# Patient Record
Sex: FEMALE | Race: WHITE | Employment: FULL TIME | URBAN - METROPOLITAN AREA
[De-identification: names, ages, dates, MRNs, and addresses within clinical notes are randomized per-mention and may not be internally consistent; named-entity substitution may affect disease eponyms.]

---

## 2018-08-28 ENCOUNTER — HOSPITAL ENCOUNTER (OUTPATIENT)
Dept: CT IMAGING | Age: 53
Discharge: HOME | End: 2018-08-28
Attending: COLON & RECTAL SURGERY
Payer: COMMERCIAL

## 2018-08-28 DIAGNOSIS — R10.32 LLQ PAIN: ICD-10-CM

## 2018-08-28 PROCEDURE — 74177 CT ABD & PELVIS W/CONTRAST: CPT

## 2018-08-28 PROCEDURE — 74011000258 HC RX REV CODE- 258: Performed by: COLON & RECTAL SURGERY

## 2018-08-28 PROCEDURE — 74011636320 HC RX REV CODE- 636/320: Performed by: COLON & RECTAL SURGERY

## 2018-08-28 RX ORDER — SODIUM CHLORIDE 0.9 % (FLUSH) 0.9 %
10 SYRINGE (ML) INJECTION
Status: COMPLETED | OUTPATIENT
Start: 2018-08-28 | End: 2018-08-28

## 2018-08-28 RX ADMIN — SODIUM CHLORIDE 100 ML: 900 INJECTION, SOLUTION INTRAVENOUS at 16:47

## 2018-08-28 RX ADMIN — Medication 10 ML: at 16:47

## 2018-08-28 RX ADMIN — IOPAMIDOL 100 ML: 755 INJECTION, SOLUTION INTRAVENOUS at 16:47

## 2021-11-29 ENCOUNTER — OFFICE VISIT (OUTPATIENT)
Dept: PRIMARY CARE CLINIC | Age: 56
End: 2021-11-29

## 2021-11-29 VITALS
BODY MASS INDEX: 24.24 KG/M2 | SYSTOLIC BLOOD PRESSURE: 123 MMHG | OXYGEN SATURATION: 98 % | HEART RATE: 90 BPM | DIASTOLIC BLOOD PRESSURE: 83 MMHG | RESPIRATION RATE: 12 BRPM | TEMPERATURE: 98 F | HEIGHT: 64 IN | WEIGHT: 142 LBS

## 2021-11-29 DIAGNOSIS — M79.10 MYALGIA: ICD-10-CM

## 2021-11-29 DIAGNOSIS — J06.9 VIRAL URI: Primary | ICD-10-CM

## 2021-11-29 LAB
QUICKVUE INFLUENZA TEST: NEGATIVE
SARS-COV-2 POC: NEGATIVE
VALID INTERNAL CONTROL?: YES

## 2021-11-29 PROCEDURE — 87804 INFLUENZA ASSAY W/OPTIC: CPT | Performed by: NURSE PRACTITIONER

## 2021-11-29 PROCEDURE — 99203 OFFICE O/P NEW LOW 30 MIN: CPT | Performed by: NURSE PRACTITIONER

## 2021-11-29 PROCEDURE — 87426 SARSCOV CORONAVIRUS AG IA: CPT | Performed by: NURSE PRACTITIONER

## 2021-11-29 RX ORDER — THERA TABS 400 MCG
1 TAB ORAL DAILY
COMMUNITY

## 2021-11-29 NOTE — PROGRESS NOTES
Subjective:   Blease Oppenheim presents for evaluation of Cold Symptoms (Nov 27,2021)     This started 1 day ago, and is stable since that time. She also reports fever, fatigue, myalgias, headache and sore throat. Throat is \"scratchy\". Left ear feels a little clogged. She denies a history of: bilateral ear pain, dry cough, productive cough, SOB and PHILIPPE. Treatments have included: OTC products, Nyquil last night. Relevant PMH: No pertinent additional PMH. Patient reports sick contacts: no   She has not had flu or Covid vaccine. /83 (BP 1 Location: Left upper arm, BP Patient Position: Sitting, BP Cuff Size: Adult)   Pulse 90   Temp 98 °F (36.7 °C) (Skin)   Resp 12   Ht 5' 4\" (1.626 m)   Wt 142 lb (64.4 kg)   SpO2 98%   BMI 24.37 kg/m²     Physical Exam  General: alert, cooperative, no distress  Eye exam: negative  Ear exam: normal TM's and external ear canals AU  Sinus exam: Normal paranasal sinuses without tenderness  Oropharynx exam: Lips, mucosa, and tongue normal. Teeth and gums normal and normal findings: oropharynx pink & moist without lesions or evidence of thrush  Neck exam: supple, symmetrical, trachea midline and no adenopathy  Heart exam: normal rate, regular rhythm, normal S1, S2, no murmurs, rubs, clicks or gallops  Lung exam: clear to auscultation bilaterally      Results for orders placed or performed in visit on 11/29/21   AMB POC SARS-COV-2   Result Value Ref Range    SARS-COV-2 POC Negative Negative   AMB POC RAPID INFLUENZA TEST   Result Value Ref Range    VALID INTERNAL CONTROL POC Yes     QuickVue Influenza test Negative Negative   -[facility]    Assessment/Plan:   1. Viral URI  -     AMB POC SARS-COV-2  -     AMB POC RAPID INFLUENZA TEST  2. Myalgia  -     AMB POC RAPID INFLUENZA TEST    The above diagnosis is a new problem. We discussed expected course, resolution, and complications of diagnosis in detail.    Recommend home quarantine and consider repeat Covid testing in 24-48 hours if symptoms persist or worsen. No follow-ups on file. An electronic signature was used to authenticate this note.   -- Michael Diane, NP

## 2021-11-29 NOTE — PATIENT INSTRUCTIONS
Viral Respiratory Infection: Care Instructions  Your Care Instructions     Viruses are very small organisms. They grow in number after they enter your body. There are many types that cause different illnesses, such as colds and the mumps. The symptoms of a viral respiratory infection often start quickly. They include a fever, sore throat, and runny nose. You may also just not feel well. Or you may not want to eat much. Most viral respiratory infections are not serious. They usually get better with time and self-care. Antibiotics are not used to treat a viral infection. That's because antibiotics will not help cure a viral illness. In some cases, antiviral medicine can help your body fight a serious viral infection. Follow-up care is a key part of your treatment and safety. Be sure to make and go to all appointments, and call your doctor if you are having problems. It's also a good idea to know your test results and keep a list of the medicines you take. How can you care for yourself at home? · Rest as much as possible until you feel better. · Be safe with medicines. Take your medicine exactly as prescribed. Call your doctor if you think you are having a problem with your medicine. You will get more details on the specific medicine your doctor prescribes. · Take an over-the-counter pain medicine, such as acetaminophen (Tylenol), ibuprofen (Advil, Motrin), or naproxen (Aleve), as needed for pain and fever. Read and follow all instructions on the label. Do not give aspirin to anyone younger than 20. It has been linked to Reye syndrome, a serious illness. · Drink plenty of fluids. Hot fluids, such as tea or soup, may help relieve congestion in your nose and throat. If you have kidney, heart, or liver disease and have to limit fluids, talk with your doctor before you increase the amount of fluids you drink. · Try to clear mucus from your lungs by breathing deeply and coughing.   · Gargle with warm salt water once an hour. This can help reduce swelling and throat pain. Use 1 teaspoon of salt mixed in 1 cup of warm water. · Do not smoke or allow others to smoke around you. If you need help quitting, talk to your doctor about stop-smoking programs and medicines. These can increase your chances of quitting for good. To avoid spreading the virus  · Cough or sneeze into a tissue. Then throw the tissue away. · If you don't have a tissue, use your hand to cover your cough or sneeze. Then clean your hand. You can also cough into your sleeve. · Wash your hands often. Use soap and warm water. Wash for 15 to 20 seconds each time. · If you don't have soap and water near you, you can clean your hands with alcohol wipes or gel. When should you call for help? Call your doctor now or seek immediate medical care if:    · You have a new or higher fever.     · Your fever lasts more than 48 hours.     · You have trouble breathing.     · You have a fever with a stiff neck or a severe headache.     · You are sensitive to light.     · You feel very sleepy or confused. Watch closely for changes in your health, and be sure to contact your doctor if:    · You do not get better as expected. Where can you learn more? Go to http://www.gray.com/  Enter Q795 in the search box to learn more about \"Viral Respiratory Infection: Care Instructions. \"  Current as of: July 6, 2021               Content Version: 13.0  © 6431-5101 [employer]. Care instructions adapted under license by [employer] (which disclaims liability or warranty for this information). If you have questions about a medical condition or this instruction, always ask your healthcare professional. [place] [address] any warranty or liability for your use of this information.

## 2022-01-03 ENCOUNTER — OFFICE VISIT (OUTPATIENT)
Dept: PRIMARY CARE CLINIC | Age: 57
End: 2022-01-03

## 2022-01-03 DIAGNOSIS — U07.1 COVID-19: Primary | ICD-10-CM

## 2022-01-03 LAB — SARS-COV-2 POC: POSITIVE

## 2022-01-03 PROCEDURE — 87426 SARSCOV CORONAVIRUS AG IA: CPT | Performed by: NURSE PRACTITIONER

## 2022-01-03 PROCEDURE — 99441 PR PHYS/QHP TELEPHONE EVALUATION 5-10 MIN: CPT | Performed by: NURSE PRACTITIONER

## 2022-01-03 NOTE — PROGRESS NOTES
Sybil Brasher is a 61 y.o. female, evaluated via audio-only technology on 1/3/2022 for Concern For COVID-19 (Coronavirus)    Presents to [facility]. C/o fever, chills, cough, fatigue, myalgias, headache, congestion, runny nose, and nausea for 2 days. No known sick contacts. Has not had covid vaccine. Results for orders placed or performed in visit on 01/03/22   AMB POC SARS-COV-2   Result Value Ref Range    SARS-COV-2 POC Positive (A) Negative         Assessment & Plan:   Diagnoses and all orders for this visit:    1. COVID-19  -     AMB POC SARS-COV-2      The complexity of medical decision making for this visit is low         12  Subjective:       Prior to Admission medications    Medication Sig Start Date End Date Taking? Authorizing Provider   therapeutic multivitamin [facility]) tablet Take 1 Tablet by mouth daily. Provider, Historical   oxycodone-acetaminophen (PERCOCET) 5-325 mg per tablet Take 1 tablet by mouth every four (4) hours as needed. Patient not taking: Reported on 11/29/2021 10/31/14   Yadi Morales MD   estrogens, conjugated,-methylTESTOSTERone (ESTRATEST HS) 0.625-1.25 mg per tablet Take 1 tablet by mouth nightly. Patient not taking: Reported on 11/29/2021    Provider, Historical     No Known Allergies  Past Medical History:   Diagnosis Date    Cancer ([address]     skin    Nausea & vomiting     Unspecified adverse effect of anesthesia     hard to wake up     Past Surgical History:   Procedure Laterality Date    HX GI      COLONOSCOPY    HX GYN      ABALATION AND HYSTERECTOMY    HX PREMALIG/BENIGN SKIN LESION EXCISION      aggressive squamous cell x 2 with surgeries    HX TUBAL LIGATION      [place] BREAST SURGERY PROCEDURE UNLISTED      CYSTS L BREAST        ROS    No flowsheet data found.     Sybil Brasher, who was evaluated through a patient-initiated, synchronous (real-time) audio only encounter, and/or her healthcare decision maker, is aware that it is a billable service, with coverage as determined by her insurance carrier. She provided verbal consent to proceed: Yes. She has not had a related appointment within my department in the past 7 days or scheduled within the next 24 hours. The patient was called for notification of a POSITIVE test result for COVID-19. The following information was given to the patient:     The COVID-19 test result was positive   Mild and stable symptoms are managed at home     Treatment of coronavirus does not require an antibiotic   Remain isolated for 10 days since symptoms first appeared AND at least 3 days have passed after recovery    o Recovery is defined as resolution of fever without the use of fever-reducing medications with progressive improvement or resolution of other symptoms     Wash hands often with soap and water for at least 20 seconds or alternatively use hand  with at least 60% alcohol content   Cover coughs and sneezes   Wear a mask when around others if possible   Clean all high-touch surfaces every day, such as doorknobs and cellphones   Continually monitor symptoms. Contact your medical provider if symptoms are worsening, such as difficulty breathing   For more information visit the CDC website: DotProtection.gl       On this date 01/03/2022 I have spent 5 minutes reviewing previous notes, test results and face to face (virtual) with the patient discussing the diagnosis and importance of compliance with the treatment plan as well as documenting on the day of the visit.     Fredy Garcia NP

## 2023-05-18 RX ORDER — ESTERIFIED ESTROGEN AND METHYLTESTOSTERONE .625; 1.25 MG/1; MG/1
1 TABLET ORAL NIGHTLY
COMMUNITY

## 2023-05-18 RX ORDER — OXYCODONE HYDROCHLORIDE AND ACETAMINOPHEN 5; 325 MG/1; MG/1
1 TABLET ORAL EVERY 4 HOURS PRN
COMMUNITY
Start: 2014-10-31

## 2023-08-03 ENCOUNTER — OFFICE VISIT (OUTPATIENT)
Age: 58
End: 2023-08-03

## 2023-08-03 VITALS
SYSTOLIC BLOOD PRESSURE: 92 MMHG | BODY MASS INDEX: 25.4 KG/M2 | RESPIRATION RATE: 18 BRPM | TEMPERATURE: 97 F | OXYGEN SATURATION: 96 % | HEART RATE: 88 BPM | WEIGHT: 148 LBS | DIASTOLIC BLOOD PRESSURE: 63 MMHG

## 2023-08-03 DIAGNOSIS — R50.9 FEVER, UNSPECIFIED FEVER CAUSE: ICD-10-CM

## 2023-08-03 DIAGNOSIS — U07.1 COVID-19: Primary | ICD-10-CM

## 2023-08-03 DIAGNOSIS — D84.9 IMMUNOSUPPRESSED STATUS (HCC): ICD-10-CM

## 2023-08-03 PROBLEM — C77.8 SECONDARY AND MALIGNANT NEOPLASM OF LYMPH NODES OF MULTIPLE SITES (HCC): Status: ACTIVE | Noted: 2023-03-01

## 2023-08-03 PROBLEM — C44.42 SQUAMOUS CELL CARCINOMA OF NECK: Status: ACTIVE | Noted: 2023-02-22

## 2023-08-03 LAB
Lab: ABNORMAL
QC PASS/FAIL: ABNORMAL
SARS-COV-2, POC: DETECTED

## 2023-08-03 RX ORDER — LORATADINE 10 MG/1
10 TABLET ORAL DAILY
COMMUNITY

## 2023-08-03 RX ORDER — ACETAMINOPHEN 500 MG
TABLET ORAL PRN
COMMUNITY

## 2023-08-03 RX ORDER — CALCIUM CARB/VITAMIN D3/VIT K1 650MG-12.5
2 TABLET,CHEWABLE ORAL DAILY
COMMUNITY
End: 2023-08-03

## 2023-08-03 RX ORDER — DIPHENHYDRAMINE HYDROCHLORIDE 25 MG/1
10 TABLET ORAL DAILY
COMMUNITY

## 2023-08-03 NOTE — PROGRESS NOTES
Lucia Larson is a 60 y.o. female who presents for sinus congestion and low grade fever since yesterday. She states she was at the hair salon on Tuesday and noticed strong scents of chemicals there, woke up yesterday with a sinus HA and states temp has been . Clear nasal drainage. No sore throat or coughing or ear pain or other symptoms. She is on immunotherapy infusions q6wk for SCC onc is at [facility], last infusion was 4 wks ago. She has tried nyquil last night. Denies sick contacts. She is not vaccinated against covid. Past Medical History:   Diagnosis Date    Cancer ([address])     skin    Nausea & vomiting     Unspecified adverse effect of anesthesia     hard to wake up     Past Surgical History:   Procedure Laterality Date    BREAST SURGERY      CYSTS L BREAST     GI      COLONOSCOPY    GYN      ABALATION AND HYSTERECTOMY    PRE-MALIGNANT / BENIGN SKIN LESION EXCISION      aggressive squamous cell x 2 with surgeries    TUBAL LIGATION           Meds:   Current Outpatient Medications on File Prior to Visit   Medication Sig Dispense Refill    acetaminophen (TYLENOL) 500 MG tablet Take by mouth as needed      Multiple Vitamin (THERAPEUTIC MULTIVITAMIN PO) Take 1 tablet by mouth daily      Biotin 5 MG CAPS Take 10 mg by mouth daily      loratadine (CLARITIN) 10 MG tablet Take 1 tablet by mouth daily       No current facility-administered medications on file prior to visit.        Allergies:   No Known Allergies    Smoker:  Social History     Tobacco Use   Smoking Status Former    Packs/day: 0.25    Types: Cigarettes    Quit date: 1982    Years since quittin.6    Passive exposure: Never   Smokeless Tobacco Never       ETOH:   Social History     Substance and Sexual Activity   Alcohol Use Never       FH:   Family History   Problem Relation Age of Onset    Other Father         AUTO ACCIDENT    Hypertension Mother     Hypertension Father        ROS:  Per HPI    Physical Exam:  BP 92/63 (Site: Left Upper Arm,

## 2024-03-25 ENCOUNTER — HOSPITAL ENCOUNTER (OUTPATIENT)
Facility: HOSPITAL | Age: 59
Setting detail: RECURRING SERIES
Discharge: HOME | End: 2024-03-28
Payer: COMMERCIAL

## 2024-03-25 VITALS — WEIGHT: 159 LBS | BODY MASS INDEX: 27.14 KG/M2 | HEIGHT: 64 IN

## 2024-03-25 PROCEDURE — 97535 SELF CARE MNGMENT TRAINING: CPT

## 2024-03-25 PROCEDURE — 97162 PT EVAL MOD COMPLEX 30 MIN: CPT

## 2024-03-25 PROCEDURE — 97140 MANUAL THERAPY 1/> REGIONS: CPT

## 2024-03-25 PROCEDURE — 97110 THERAPEUTIC EXERCISES: CPT

## 2024-03-25 NOTE — THERAPY EVALUATION
Statement of Medical Necessity  Page 1 of 2      Kristin Ron 1965 Today's Date: 3/25/2024 HOWARD: [employer]   Payor: AETNA / Plan: AETNA / Product Type: *No Product type* /  ME: TBD  Refills: 2                   Diagnosis  []   I97.2 Post-Mastectomy Lymphedema []   I87.2 Venous Insufficiency   [x]   I89.0 Lymphedema, other secondary  []   I83.019 Venous Stasis Ulcer LE, Right   []   I89.9 Unspecified Lymphatic Disorder []   I83.029 Venous Stasis Ulcer LE, Left   []   R60.9 Swelling not relieved by elevation []   Q82.0 Hereditary/ Congenital Lymphedema   []   C50.211 Malignant neoplasm of breast, Right []   G89.3  Cancer associated pain   []   C50.212 Malignant neoplasm of breast, Left []   L03.115 LE Cellulitis, Right   []    []   L03.116 LE Cellulitis, Left                                   Kristin Ron    1965  Page 2 of 2    Physician Order for DME for Diagnosis of head and neck lymphedema as Listed on Statement of Medical Necessity, Page 1         Recommended Product:  Units  Head and neck lymphedema Rt Lt Units Lower Extremity Rt Lt    Circ-Aid, Ready Wrap, Sigvaris Arm    Inelastic binders (Circ-Aid, Farrow)  []Foot   []Below Knee   []Knee   []Thigh      Circ-Aid Ready Wrap, Sigvaris hand    Chevy Onesimo, night use []Full Leg  []Lower Leg      Tribute Arm, night use    Tribute, night use  []Full Leg  []Lower Leg      Chevy Onesimo Arm, night use    Heri Sleeve Leg/ Foot, night use      Gradiant Compression Sleeves  []Custom [] RM Arm:  []CCL1 []CCL2 []CCL3  []Custom [] RM:  []Glove  []Gauntlet:                         []CCL1 []CCL2 []CCL3      Gradient Compression Stockings   []Custom  []RM Lower Extremity:   []CCL1       []CCL2         []CCL3   []Knee       []Thigh        []Waist Length      Heri sleeve arm w/ hand, night use    Tribute Wrap, night use     2 Day compression for head and neck gilmar x x       1  Foam based night head and neck compression x x       The above patient was referred for treatment of 
with bilateral axillary nodes, anterior and posterior axillo-axillary anastamoses from midline to corresponding lymph node group,  supraclavicular techniques, cervical techniques, shoulder techniques from neck to acromion.  Worked anterior and lateral neck toward corresponding side of axillo axillary anastamosis on anterior upper quadrant.     Initiated patient and family education in manual lymph drainage techniques to trunk and neck today with therapist demonstration followed by patient redemonstration and handout provided.  They also recorded techniques.  Further review is needed.   100 72    Total Total       Patient Education: [x] Review HEP  -handout provided for cervical and shoulder range of motion  [x]  Patient Education billed concurrently with other procedures   [x] MLD Patient Education -initiated and handout provided  [] Progressed/Changed HEP based on:   [] positioning   [] Kinesiotape   [x] Skin care   [] wound care   [x] other: treatment components and goals, positioning,   [x]   vasopneumatic device availability  Patient / caregiver re-demonstrated bandaging. 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 Patient has received immunotherapy treatments but no radiation.  Discomfort is present in the region along with decreased cervical and left shoulder range of motion.  Tissue texture changes  are present in the affected region.  Patient

## 2024-04-01 ENCOUNTER — HOSPITAL ENCOUNTER (OUTPATIENT)
Facility: HOSPITAL | Age: 59
Setting detail: RECURRING SERIES
Discharge: HOME | End: 2024-04-04
Payer: COMMERCIAL

## 2024-04-01 PROCEDURE — 97110 THERAPEUTIC EXERCISES: CPT

## 2024-04-01 PROCEDURE — 97140 MANUAL THERAPY 1/> REGIONS: CPT

## 2024-04-01 NOTE — PROGRESS NOTES
PHYSICAL THERAPY/OCCUPATIONAL THERAPY - DAILY TREATMENT NOTE (updated 3/23)      Date: 2024          Patient Name:  Kristin Ron :  1965   Medical   Diagnosis:  Lymphedema, not elsewhere classified [I89.0] Treatment Diagnosis:  I89.0     Lymphedema, not elsewhere classified    Referral Source:  Brett Camarena MD Insurance:   Payor: AETNA / Plan: AETNA / Product Type: *No Product type* /                     Patient  verified yes     Visit #   Current  / Total 2 16   Time   In / Out [id number]   Total Treatment Time 58   Total Timed Codes 58         SUBJECTIVE    Pain Level (0-10 scale): 0 out of 10 numeric scale, tightness in cervical region with exercises    Any medication changes, allergies to medications, adverse drug reactions, diagnosis change, or new procedure performed?: [x] No    [] Yes (see summary sheet for update)  Medications: Verified on Patient Summary List    Subjective functional status/changes:     I want to be sure I am doing the manual lymph drainage techniques right.  I want the swelling to go away.    OBJECTIVE      Therapeutic Procedures:  Tx Min Billable or 1:1 Min (if diff from Tx Min) Procedure, Rationale, Specifics   [id number] Therapeutic Exercise (timed):  increase ROM, strength, coordination, balance, and proprioception to improve patient's ability to progress to PLOF and address remaining functional goals. (see flow sheet as applicable)    Details if applicable:    Therapeutic Exercise:  [] Chen Ball Exercises [] Remedial Lymphedema Exercise Program [x]   Deep abdominal breathing  [x]  Cervical range of motion exercises with 3 repetitions of each  and 20 second hole with all positions except extension.  Handout provided.  Patient redemonstrated with good performance.      [phone] [id number] Manual Therapy (timed):  decrease pain, increase ROM, increase tissue extensibility, decrease edema, and increase postural awareness to improve patient's ability to progress to PLOF

## 2024-04-15 ENCOUNTER — HOSPITAL ENCOUNTER (OUTPATIENT)
Facility: HOSPITAL | Age: 59
Setting detail: RECURRING SERIES
Discharge: HOME | End: 2024-04-18
Payer: COMMERCIAL

## 2024-04-15 PROCEDURE — 97140 MANUAL THERAPY 1/> REGIONS: CPT

## 2024-04-15 PROCEDURE — 97110 THERAPEUTIC EXERCISES: CPT

## 2024-04-15 PROCEDURE — 97760 ORTHOTIC MGMT&TRAING 1ST ENC: CPT

## 2024-04-15 NOTE — PROGRESS NOTES
21% to 15%.   2.   Patient/caregiver will demonstrate improved edema management as evidenced by performing donning/doffing of garments modified independent 3/3 times within session to aid in reducing risk for infection and promote transition to maintenance phase of CDT.  3.   Patient will demonstrate a decrease in circumference at TMJ to chin  by 1.0 cm and  chin to apex of head by 1.5cm to reduce the risk of infection and progression of swelling over time for ease of performing driving and safe mobility.   4. Patient will demonstrate an increase in right cervical rotation active range of motion of 20 degrees for ease of performing ADL's, including dressing and functional activities like driving..   5. Patient will demonstrate a decrease in circumference at TMJ to  eye  by 0.7 cm and  to upper neck by 1.5cm to reduce the risk of infection and progression of swelling over time for ease of performing driving and safe mobility.      PLAN   yes  Continue plan of care  Re-Cert Due: 16 treatments  []  Upgrade activities as tolerated  []  Discharge due to:  [x]  Other: continue with treatment and education in home program,          PRIYA Hernandez, MARY ANNE-TAMMIE      4/15/2024       4:25 PM

## 2024-04-18 ENCOUNTER — APPOINTMENT (OUTPATIENT)
Facility: HOSPITAL | Age: 59
End: 2024-04-18
Payer: COMMERCIAL

## 2024-04-22 ENCOUNTER — HOSPITAL ENCOUNTER (OUTPATIENT)
Facility: HOSPITAL | Age: 59
Setting detail: RECURRING SERIES
Discharge: HOME | End: 2024-04-25
Payer: COMMERCIAL

## 2024-04-22 PROCEDURE — 97016 VASOPNEUMATIC DEVICE THERAPY: CPT

## 2024-04-22 PROCEDURE — 97535 SELF CARE MNGMENT TRAINING: CPT

## 2024-04-22 PROCEDURE — 97140 MANUAL THERAPY 1/> REGIONS: CPT

## 2024-04-22 NOTE — PROGRESS NOTES
compression products to address functional deficits and attain remaining goals.      CURRENT STATUS/GOALS    Short Term Goals: To be accomplished in 8 treatments  Patient and/or caregiver will verbalize 3/3 signs and symptoms of infection without external cueing, in order to reduce the risk of infection and skin impairment and to promote optimal self management of condition.   Reviewed today.    Status at last Eval/Progress Note: initiated  Current Status: in progress  Goal Met?  no    Patient to perform 5/5 lymphedema remedial exercises/cervical range of motion exercises in session with modified independence utilizing HEP handout, in order to promote optimal independence with management of condition, as well as promote optimal limb volume reduction required for proper fit of donned clothing. Reviewed today with good performance.     Status at last Eval/Progress Note: initiated  Current Status: in progress  Goal Met?  no    Patient will demonstrate a decrease in circumference at upper neck  by 1.0cm and lower neck  by 0.8 cm  to reduce the risk of infection and progression of swelling over time for ease of performing driving, speaking, and swallowing and safe mobility.    Status at last Eval/Progress Note: initiated  Current Status: in progress  Goal Met?  no     Patient will be measured for and obtain comfortable and optimal fitting compression products to prevent reaccumulation of fluid at discharge which could impair patient's ability to perform safe mobility, eating and swallowing and driving.    Status at last Eval/Progress Note: initiated  Current Status: in progress  Goal Met?  no    5.    Patient will demonstrate an increase in cervical rotation active range of motion by 10 degrees  to the right and to the left for ease of performing ADL's, including dressing and functional activities like driving..    Status at last Eval/Progress Note: initiated  Current Status: in progress  Goal Met?  no       Long Term 
CDT.  3.   Patient will demonstrate a decrease in circumference at TMJ to chin  by 1.0 cm and  chin to apex of head by 1.5cm to reduce the risk of infection and progression of swelling over time for ease of performing driving and safe mobility. In progress   4. Patient will demonstrate an increase in right cervical rotation active range of motion of 20 degrees for ease of performing ADL's, including dressing and functional activities like driving.. In progress.  5. Patient will demonstrate a decrease in circumference at TMJ to  eye  by 0.7 cm and  to upper neck by 1.5cm to reduce the risk of infection and progression of swelling over time for ease of performing driving and safe mobility. In progress.      PLAN   yes  Continue plan of care  Re-Cert Due: 16 treatments  []  Upgrade activities as tolerated  []  Discharge due to:  [x]  Other: continue with treatment and education in home program,          PRIYA Hernandez, CLT-TAMMIE      4/22/2024       3:38 PM

## 2024-04-29 ENCOUNTER — HOSPITAL ENCOUNTER (OUTPATIENT)
Facility: HOSPITAL | Age: 59
Setting detail: RECURRING SERIES
Discharge: HOME | End: 2024-05-02
Payer: COMMERCIAL

## 2024-04-29 PROCEDURE — 97140 MANUAL THERAPY 1/> REGIONS: CPT

## 2024-04-29 NOTE — PROGRESS NOTES
1.5cm to reduce the risk of infection and progression of swelling over time for ease of performing driving and safe mobility. In progress.      PLAN   yes  Continue plan of care  Re-Cert Due: 16 treatments  []  Upgrade activities as tolerated  []  Discharge due to:  [x]  Other: continue with treatment and education in home program,          PRIYA Hernandez, MARY ANNE-TAMMIE      4/29/2024       3:18 PM

## 2024-05-09 ENCOUNTER — HOSPITAL ENCOUNTER (OUTPATIENT)
Facility: HOSPITAL | Age: 59
Setting detail: RECURRING SERIES
Discharge: HOME | End: 2024-05-12
Payer: COMMERCIAL

## 2024-05-09 PROCEDURE — 97140 MANUAL THERAPY 1/> REGIONS: CPT

## 2024-05-09 NOTE — PROGRESS NOTES
and attain remaining goals.    Progress toward goals / Updated goals:  [x]  See Progress Note/Recertification     Short Term Goals: To be accomplished in 8 treatments  Patient and/or caregiver will verbalize 3/3 signs and symptoms of infection without external cueing, in order to reduce the risk of infection and skin impairment and to promote optimal self management of condition.    Met  Patient to perform 5/5 lymphedema remedial exercises/cervical range of motion exercises in session with modified independence utilizing HEP handout, in order to promote optimal independence with management of condition, as well as promote optimal limb volume reduction required for proper fit of donned clothing.  Met.  Patient will demonstrate a decrease in circumference at upper neck  by 1.0cm and lower neck  by 0.8 cm  to reduce the risk of infection and progression of swelling over time for ease of performing driving, speaking, and swallowing and safe mobility. Met for upper neck.  In progress for lower neck.   Patient will be measured for and obtain comfortable and optimal fitting compression products to prevent reaccumulation of fluid at discharge which could impair patient's ability to perform safe mobility, eating and swallowing and driving. Patient is considering ordering.  5.    Patient will demonstrate an increase in cervical rotation active range of motion by 10 degrees  to the right and to the left for ease of performing ADL's, including dressing and functional activities like driving. Met for right rotation.       Long Term Goals: To be accomplished in  16 treatments  Patient will demonstrate an improvement in self perceived functional impairment as evidenced by an improved score on the Lymphedema Life Impact Scale from 21% to 15%. Goal met.  2.   Patient/caregiver will demonstrate improved edema management as evidenced by performing donning/doffing of garments modified independent 3/3 times within session to aid in

## 2024-05-16 ENCOUNTER — HOSPITAL ENCOUNTER (OUTPATIENT)
Facility: HOSPITAL | Age: 59
Setting detail: RECURRING SERIES
Discharge: HOME | End: 2024-05-19
Payer: COMMERCIAL

## 2024-05-16 PROCEDURE — 97535 SELF CARE MNGMENT TRAINING: CPT

## 2024-05-16 PROCEDURE — 97140 MANUAL THERAPY 1/> REGIONS: CPT

## 2024-05-16 NOTE — PROGRESS NOTES
[facility]  a part of [facility]   [address], [address]  [place] [address]  Phone: [phone]  Fax: [phone]    PHYSICAL THERAPY/OCCUPATIONAL THERAPY PROGRESS NOTE  Patient Name:  Kristin Ron :  1965   Treatment/Medical Diagnosis: Lymphedema, not elsewhere classified [I89.0]   Referral Source:  Brett Camarena MD     Date of Initial Visit:  3/25/2024 Attended Visits:  7 Missed Visits:  0     SUMMARY OF TREATMENT/ASSESSMENT:     Patient has been performing home program daily since she was last seen in therapy.  Vasopneumatic device has been delivered and patient has implemented product use at home using it 1 to 2 times a day.   Reassessment of head and neck measurements  yields decreases at 7 out of  13 measurements, increases  at  2 out of 13 measurements, and 4 measurements remain the same .    Short term goal 1 and 2 are met met. Long term goal 1 is met.  All other short and long term goals are in progress.    Patient will continue to benefit from skilled PT / OT services to address ROM deficits, address swelling, analyze and address soft tissue restrictions, analyze and modify body mechanics/ergonomics, analyze compression product fit and use, assess and modify postural abnormalities, instruct in home lymphedema management program, and measure for compression products to address functional deficits and attain remaining goals.       CURRENT STATUS/GOALS    Short Term Goals: To be accomplished in 8 treatments  Patient and/or caregiver will verbalize 3/3 signs and symptoms of infection without external cueing, in order to reduce the risk of infection and skin impairment and to promote optimal self management of condition.        Status at last Eval/Progress Note:  in progress  Current Status: met  Goal Met?   yes    Patient to perform 5/5 lymphedema remedial exercises/cervical range of motion exercises in session with modified independence utilizing HEP 
to 2 times a day.   Reassessment of head and neck measurements  yields decreases at 7 out of  13 measurements, increases  at  2 out of 13 measurements, and 4 measurements remain the same .    Short term goal 1 and 2 are met met. Long term goal 1 is met.  All other short and long term goals are in progress.    Patient will continue to benefit from skilled PT / OT services to address ROM deficits, address swelling, analyze and address soft tissue restrictions, analyze and modify body mechanics/ergonomics, analyze compression product fit and use, assess and modify postural abnormalities, instruct in home lymphedema management program, and measure for compression products to address functional deficits and attain remaining goals.    Progress toward goals / Updated goals:  [x]  See Progress Note/Recertification     Short Term Goals: To be accomplished in 8 treatments  Patient and/or caregiver will verbalize 3/3 signs and symptoms of infection without external cueing, in order to reduce the risk of infection and skin impairment and to promote optimal self management of condition.    Met  Patient to perform 5/5 lymphedema remedial exercises/cervical range of motion exercises in session with modified independence utilizing HEP handout, in order to promote optimal independence with management of condition, as well as promote optimal limb volume reduction required for proper fit of donned clothing.  Met.  Patient will demonstrate a decrease in circumference at upper neck  by 1.0cm and lower neck  by 0.8 cm  to reduce the risk of infection and progression of swelling over time for ease of performing driving, speaking, and swallowing and safe mobility. Met for upper neck.  In progress for lower neck.   Patient will be measured for and obtain comfortable and optimal fitting compression products to prevent reaccumulation of fluid at discharge which could impair patient's ability to perform safe mobility, eating and swallowing and

## 2024-05-30 ENCOUNTER — HOSPITAL ENCOUNTER (OUTPATIENT)
Facility: HOSPITAL | Age: 59
Setting detail: RECURRING SERIES
End: 2024-05-30
Payer: COMMERCIAL

## 2024-05-30 PROCEDURE — 97110 THERAPEUTIC EXERCISES: CPT

## 2024-05-30 PROCEDURE — 97140 MANUAL THERAPY 1/> REGIONS: CPT

## 2024-05-30 NOTE — PROGRESS NOTES
PHYSICAL THERAPY/OCCUPATIONAL THERAPY - DAILY TREATMENT NOTE (updated 3/23)      Date: 2024          Patient Name:  Kristin Ron :  1965   Medical   Diagnosis:  Lymphedema, not elsewhere classified [I89.0] Treatment Diagnosis:  I89.0     Lymphedema, not elsewhere classified    Referral Source:  Brett Camarena MD Insurance:   Payor: AETNA / Plan: AETNA / Product Type: *No Product type* /                     Patient  verified yes     Visit #   Current  / Total 8 16   Time   In / Out   0902 1030    Total Treatment Time  88   Total Timed Codes 88          SUBJECTIVE    Pain Level (0-10 scale): 0 out of 10 numeric scale     Any medication changes, allergies to medications, adverse drug reactions, diagnosis change, or new procedure performed?: [x] No    [] Yes (see summary sheet for update)  Medications: Verified on Patient Summary List    Subjective functional status/changes:      I am using the Flexitouch every day and I am hoping that it is helping me.  I still notice swelling in the left cheek.        OBJECTIVE      Therapeutic Procedures:  Tx Min Billable or 1:1 Min (if diff from Tx Min) Procedure, Rationale, Specifics   8 [phone] [id number] Therapeutic Exercise (timed):  increase ROM, strength, coordination, balance, and proprioception to improve patient's ability to progress to PLOF and address remaining functional goals. (see flow sheet as applicable)    Details if applicable:     Therapeutic Exercise:  [] Chen Ball Exercises [] Remedial Lymphedema Exercise Program []   Deep abdominal breathing  [x]  Cervical range of motion exercises     Patient had questions about neck tightness with mobility.  Reinforced the importance of daily performance cervical range of motion home exercise program and how this will help improve the mobility and tightness over time.  Discussed left shoulder range of motion exercises and patient reports she is not performing those exercises consistently.  Educated in the

## 2024-06-06 ENCOUNTER — HOSPITAL ENCOUNTER (OUTPATIENT)
Facility: HOSPITAL | Age: 59
Setting detail: RECURRING SERIES
Discharge: HOME | End: 2024-06-09
Payer: COMMERCIAL

## 2024-06-06 PROCEDURE — 97140 MANUAL THERAPY 1/> REGIONS: CPT

## 2024-06-06 PROCEDURE — 97110 THERAPEUTIC EXERCISES: CPT

## 2024-06-06 NOTE — PROGRESS NOTES
activities like driving.. In progress.  5. Patient will demonstrate a decrease in circumference at TMJ to  eye  by 0.7 cm and  to upper neck by 1.5cm to reduce the risk of infection and progression of swelling over time for ease of performing driving and safe mobility. Met for upper neck      PLAN   yes  Continue plan of care  Re-Cert Due: 16 treatments  []  Upgrade activities as tolerated  []  Discharge due to:  [x]  Other: continue with treatment and education in home program        PRIYA Hernandez, MARY ANNE-TAMMIE      6/6/2024       1:28 PM

## 2024-06-11 ENCOUNTER — APPOINTMENT (OUTPATIENT)
Facility: HOSPITAL | Age: 59
End: 2024-06-11
Payer: COMMERCIAL

## 2024-06-13 ENCOUNTER — HOSPITAL ENCOUNTER (OUTPATIENT)
Facility: HOSPITAL | Age: 59
Setting detail: RECURRING SERIES
Discharge: HOME | End: 2024-06-16
Payer: COMMERCIAL

## 2024-06-13 PROCEDURE — 97140 MANUAL THERAPY 1/> REGIONS: CPT

## 2024-06-13 NOTE — PROGRESS NOTES
[facility]  a part of [facility]   [address], [address]  [place] [address]  Phone: [phone]  Fax: [phone]    PHYSICAL THERAPY/OCCUPATIONAL THERAPY PROGRESS NOTE  Patient Name:  Kristin Ron :  1965   Treatment/Medical Diagnosis: Lymphedema, not elsewhere classified [I89.0]   Referral Source:  Brett Camarena MD     Date of Initial Visit:  3/25/2024 Attended Visits:  10 Missed Visits:  0     SUMMARY OF TREATMENT/ASSESSMENT:        Patient has been performing home program of manual lymph drainage or advanced vasopneumatic device use as best as she could since she was last seen in therapy.  Decreased facial swelling in observed after manual lymph drainage again today. Reassessment of head and neck measurements  yields decreases at  3 out of  13 measurements ( with 2 additional measurements decreased when reassessed after MLD), increases  at  1 out of 13 measurements, and 9 measurements remain the same .  Cervical range of motion is gradually increasing and nearing functional mobility.  Will continue with treatment.      Short term goal 1, 2 and 5 are met. Long term goal 1 is met.  All other short and long term goals are in progress.    Patient will continue to benefit from skilled PT / OT services to address ROM deficits, address swelling, analyze and address soft tissue restrictions, analyze and modify body mechanics/ergonomics, analyze compression product fit and use, assess and modify postural abnormalities, instruct in home lymphedema management program, and measure for compression products to address functional deficits and attain remaining goals.      CURRENT STATUS/GOALS    Short Term Goals: To be accomplished in 8 treatments  Patient and/or caregiver will verbalize 3/3 signs and symptoms of infection without external cueing, in order to reduce the risk of infection and skin impairment and to promote optimal self management of condition.

## 2024-06-13 NOTE — PROGRESS NOTES
PHYSICAL THERAPY/OCCUPATIONAL THERAPY - DAILY TREATMENT NOTE (updated 3/23)      Date: 2024          Patient Name:  Kristin Ron :  1965   Medical   Diagnosis:  Lymphedema, not elsewhere classified [I89.0] Treatment Diagnosis:  I89.0     Lymphedema, not elsewhere classified    Referral Source:  Brett Camraena MD Insurance:   Payor: AETNA / Plan: AETNA / Product Type: *No Product type* /                     Patient  verified yes     Visit #   Current  / Total 10 16   Time   In / Out  1205 1330    Total Treatment Time  85   Total Timed Codes  85         SUBJECTIVE    Pain Level (0-10 scale): 0 out of 10 numeric scale, no pain just tightness    Any medication changes, allergies to medications, adverse drug reactions, diagnosis change, or new procedure performed?: [x] No    [] Yes (see summary sheet for update)  Medications: Verified on Patient Summary List    Subjective functional status/changes:       My left cheek still feels really tight.  I haven't had that ear pain now for several day.  I was on a bus trip and did my home program as best as I could while on the trip.  I did have an episode of numbness at my chin like what I have on my neck but it then went away.      OBJECTIVE      Therapeutic Procedures:  Tx Min Billable or 1:1 Min (if diff from Tx Min) Procedure, Rationale, Specifics    85  69 [id number] Manual Therapy (timed):  decrease pain, increase ROM, increase tissue extensibility, decrease edema, and increase postural awareness to improve patient's ability to progress to PLOF and address remaining functional goals.  The manual therapy interventions were performed at a separate and distinct time from the therapeutic activities interventions . (see flow sheet as applicable)    Details if applicable:      Performed manual lymph drainage in seated position.  Deep abdominal breathing followed by manual lymph drainage with bilateral axillary nodes, anterior  axillo-axillary anastamoses from

## 2024-06-20 ENCOUNTER — HOSPITAL ENCOUNTER (OUTPATIENT)
Facility: HOSPITAL | Age: 59
Setting detail: RECURRING SERIES
Discharge: HOME | End: 2024-06-23
Payer: COMMERCIAL

## 2024-06-20 ENCOUNTER — APPOINTMENT (OUTPATIENT)
Facility: HOSPITAL | Age: 59
End: 2024-06-20
Payer: COMMERCIAL

## 2024-06-20 PROCEDURE — 97140 MANUAL THERAPY 1/> REGIONS: CPT

## 2024-06-20 NOTE — PROGRESS NOTES
techniques, pre and retro auricular techniques,  lower facial techniques toward anterior neck.  Upper cheek facial techniques toward pre and retro auricular region.  Worked anterior and lateral neck toward corresponding side of axillo axillary anastamosis on anterior upper quadrant.       Decreased swelling is observed in left cheek pre and post treatment  today.     88    88    Total Total       Patient Education: [] Review HEP   [x]  Patient Education billed concurrently with other procedures   [x] MLD Patient Education Reviewed with patient, as well as demonstration and instruction during MLD portion of session and notes to help with sequence when performing at home  [] Progressed/Changed HEP based on:   [] positioning   [] Kinesiotape   [] Skin care   [] wound care   [] other: compression garment options, ordering process, wearing schedule and role  []    vasopneumatic device use  Patient / caregiver re-demonstrated bandaging. [] Yes  [] No  Compression bandaging/garment precautions reviewed: [] Yes  [] No      Other Objective/Functional Measures:        Pain Level at end of session (0-10 scale): 0 out of 10 numeric     Assessment   Patient has been performing home program of manual lymph drainage or advanced vasopneumatic device consistently since she was last seen in therapy.  Decreased facial swelling in observed upon arrival to the clinic today and after manual lymph drainage again today.    Will continue with treatment.      Short term goal 1, 2 and 5 are met. Long term goal 1 is met.  All other short and long term goals are in progress.    Patient will continue to benefit from skilled PT / OT services to address ROM deficits, address swelling, analyze and address soft tissue restrictions, analyze and modify body mechanics/ergonomics, analyze compression product fit and use, assess and modify postural abnormalities, instruct in home lymphedema management program, and measure for compression products to

## 2024-06-25 ENCOUNTER — APPOINTMENT (OUTPATIENT)
Facility: HOSPITAL | Age: 59
End: 2024-06-25
Payer: COMMERCIAL

## 2024-06-27 ENCOUNTER — APPOINTMENT (OUTPATIENT)
Facility: HOSPITAL | Age: 59
End: 2024-06-27
Payer: COMMERCIAL

## 2024-07-11 ENCOUNTER — HOSPITAL ENCOUNTER (OUTPATIENT)
Facility: HOSPITAL | Age: 59
Setting detail: RECURRING SERIES
Discharge: HOME | End: 2024-07-14
Payer: COMMERCIAL

## 2024-07-11 PROCEDURE — 97140 MANUAL THERAPY 1/> REGIONS: CPT

## 2024-07-11 NOTE — PROGRESS NOTES
PHYSICAL THERAPY/OCCUPATIONAL THERAPY - DAILY TREATMENT NOTE (updated 3/23)      Date: 2024          Patient Name:  Kristin Ron :  1965   Medical   Diagnosis:  Lymphedema, not elsewhere classified [I89.0] Treatment Diagnosis:  I89.0     Lymphedema, not elsewhere classified    Referral Source:  Brett Camarena MD Insurance:   Payor: AETNA / Plan: AETNA / Product Type: *No Product type* /                     Patient  verified yes     Visit #   Current  / Total 12 16   Time   In / Out  0907 1035    Total Treatment Time 88    Total Timed Codes 88          SUBJECTIVE    Pain Level (0-10 scale): 0 out of 10 numeric scale, no pain just tightness    Any medication changes, allergies to medications, adverse drug reactions, diagnosis change, or new procedure performed?: [x] No    [] Yes (see summary sheet for update)  Medications: Verified on Patient Summary List    Subjective functional status/changes:        I feel like I am doing better.  I still feel some tightness with movement but the swelling has been better.  Patient reports when questioned that she has not had episodes of worsening or increasing swelling since she was last seen in therapy.      OBJECTIVE      Therapeutic Procedures:  Tx Min Billable or 1:1 Min (if diff from Tx Min) Procedure, Rationale, Specifics    88   04 [id number] Manual Therapy (timed):  decrease pain, increase ROM, increase tissue extensibility, decrease edema, and increase postural awareness to improve patient's ability to progress to PLOF and address remaining functional goals.  The manual therapy interventions were performed at a separate and distinct time from the therapeutic activities interventions . (see flow sheet as applicable)    Details if applicable:      Performed manual lymph drainage in seated position.  Deep abdominal breathing followed by manual lymph drainage with bilateral axillary nodes, anterior  axillo-axillary anastamoses from midline to corresponding

## 2024-07-11 NOTE — PROGRESS NOTES
[facility]  a part of [facility]   [address], [address]  [place] [address]  Phone: [phone]  Fax: [phone]    PHYSICAL THERAPY/OCCUPATIONAL THERAPY PROGRESS NOTE  Patient Name:  Kristin Ron :  1965   Treatment/Medical Diagnosis: Lymphedema, not elsewhere classified [I89.0]   Referral Source:  Brett Camarena MD     Date of Initial Visit:  3/25/2024 Attended Visits:  12 Missed Visits:  1     SUMMARY OF TREATMENT/ASSESSMENT:    Patient has been performing home program of manual lymph drainage or advanced vasopneumatic device consistently since she was last seen in therapy.  Decreased facial swelling in observed upon arrival to the clinic today and after manual lymph drainage again today.   Reassessment of head and neck measurements  yields decreases at  5 out of  13 measurements,  increases  at  0 out of 13 measurements, and 8 measurements remain the same .  Cervical range of motion is now measuring within functional limits today.    Will continue with treatment.       Short term goal 1, 2,3  and 5 are met. Long term goals 1 and 4 are  met.  All other short and long term goals are in progress.     Patient will continue to benefit from skilled PT / OT services to address ROM deficits, address swelling, analyze and address soft tissue restrictions, analyze and modify body mechanics/ergonomics, analyze compression product fit and use, assess and modify postural abnormalities, instruct in home lymphedema management program, and measure for compression products to address functional deficits and attain remaining goals.    CURRENT STATUS/GOALS    Short Term Goals: To be accomplished in 8 treatments  Patient and/or caregiver will verbalize 3/3 signs and symptoms of infection without external cueing, in order to reduce the risk of infection and skin impairment and to promote optimal self management of condition.        Status at last Eval/Progress Note:

## 2024-07-18 ENCOUNTER — HOSPITAL ENCOUNTER (OUTPATIENT)
Facility: HOSPITAL | Age: 59
Setting detail: RECURRING SERIES
Discharge: HOME | End: 2024-07-21
Payer: COMMERCIAL

## 2024-07-18 PROCEDURE — 97140 MANUAL THERAPY 1/> REGIONS: CPT

## 2024-07-18 NOTE — PROGRESS NOTES
10  PHYSICAL THERAPY/OCCUPATIONAL THERAPY - DAILY TREATMENT NOTE (updated 3/23)      Date: 2024          Patient Name:  Kristin Ron :  1965   Medical   Diagnosis:  Lymphedema, not elsewhere classified [I89.0] Treatment Diagnosis:  I89.0     Lymphedema, not elsewhere classified    Referral Source:  Brett Camarena MD Insurance:   Payor: AETNA / Plan: AETNA / Product Type: *No Product type* /                     Patient  verified yes     Visit #   Current  / Total 13 16   Time   In / Out 1027   1059   Total Treatment Time  92   Total Timed Codes  92         SUBJECTIVE    Pain Level (0-10 scale): 0 out of 10 numeric scale, no pain just tightness    Any medication changes, allergies to medications, adverse drug reactions, diagnosis change, or new procedure performed?: [x] No    [] Yes (see summary sheet for update)  Medications: Verified on Patient Summary List    Subjective functional status/changes:        I had a day last week that  I noticed both of my cheeks are swollen but my thyroid numbers were different when they tested my bloodwork.  Could that affect swelling? It could have even been something I ate.      OBJECTIVE      Therapeutic Procedures:  Tx Min Billable or 1:1 Min (if diff from Tx Min) Procedure, Rationale, Specifics   92  12 29546 Manual Therapy (timed):  decrease pain, increase ROM, increase tissue extensibility, decrease edema, and increase postural awareness to improve patient's ability to progress to PLOF and address remaining functional goals.  The manual therapy interventions were performed at a separate and distinct time from the therapeutic activities interventions . (see flow sheet as applicable)    Details if applicable:      Performed manual lymph drainage in seated position.  Deep abdominal breathing followed by manual lymph drainage with bilateral axillary nodes, anterior  axillo-axillary anastamoses from midline to corresponding lymph node group,  supraclavicular

## 2024-07-22 ENCOUNTER — HOSPITAL ENCOUNTER (OUTPATIENT)
Facility: HOSPITAL | Age: 59
Setting detail: RECURRING SERIES
Discharge: HOME | End: 2024-07-25
Payer: COMMERCIAL

## 2024-07-22 PROCEDURE — 97140 MANUAL THERAPY 1/> REGIONS: CPT

## 2024-07-22 NOTE — PROGRESS NOTES
10  PHYSICAL THERAPY/OCCUPATIONAL THERAPY - DAILY TREATMENT NOTE (updated 3/23)      Date: 2024          Patient Name:  Kristin Ron :  1965   Medical   Diagnosis:  Lymphedema, not elsewhere classified [I89.0] Treatment Diagnosis:  I89.0     Lymphedema, not elsewhere classified    Referral Source:  Brett Camarena MD Insurance:   Payor: AETNA / Plan: AETNA / Product Type: *No Product type* /                     Patient  verified yes     Visit #   Current  / Total 14 16   Time   In / Out  1203 1335    Total Treatment Time 92    Total Timed Codes  92         SUBJECTIVE    Pain Level (0-10 scale): 0 out of 10 numeric scale, no pain just tightness    Any medication changes, allergies to medications, adverse drug reactions, diagnosis change, or new procedure performed?: [x] No    [] Yes (see summary sheet for update)  Medications: Verified on Patient Summary List    Subjective functional status/changes:      My neck just feels stiff today so I was doing my exercises while I was waiting to come in.         OBJECTIVE      Therapeutic Procedures:  Tx Min Billable or 1:1 Min (if diff from Tx Min) Procedure, Rationale, Specifics   92  51 [id number] Manual Therapy (timed):  decrease pain, increase ROM, increase tissue extensibility, decrease edema, and increase postural awareness to improve patient's ability to progress to PLOF and address remaining functional goals.  The manual therapy interventions were performed at a separate and distinct time from the therapeutic activities interventions . (see flow sheet as applicable)    Details if applicable:      Performed manual lymph drainage in seated position.  Deep abdominal breathing followed by manual lymph drainage with bilateral axillary nodes, anterior  axillo-axillary anastamoses from midline to corresponding lymph node group,  supraclavicular techniques, cervical techniques, shoulder techniques from neck to acromion, anterior neck and submental/submandibular

## 2024-07-25 ENCOUNTER — APPOINTMENT (OUTPATIENT)
Facility: HOSPITAL | Age: 59
End: 2024-07-25
Payer: COMMERCIAL

## 2024-08-01 ENCOUNTER — HOSPITAL ENCOUNTER (OUTPATIENT)
Facility: HOSPITAL | Age: 59
Setting detail: RECURRING SERIES
Discharge: HOME | End: 2024-08-04
Payer: COMMERCIAL

## 2024-08-01 PROCEDURE — 97140 MANUAL THERAPY 1/> REGIONS: CPT

## 2024-08-01 NOTE — PROGRESS NOTES
10  PHYSICAL THERAPY/OCCUPATIONAL THERAPY - DAILY TREATMENT NOTE (updated 3/23)      Date: 2024          Patient Name:  Kristin Ron :  1965   Medical   Diagnosis:  Lymphedema, not elsewhere classified [I89.0] Treatment Diagnosis:  I89.0     Lymphedema, not elsewhere classified    Referral Source:  Brett Camarena MD Insurance:   Payor: AETNA / Plan: AETNA / Product Type: *No Product type* /                     Patient  verified yes     Visit #   Current  / Total 15 16   Time   In / Out 0900 1032    Total Treatment Time 92    Total Timed Codes  92         SUBJECTIVE    Pain Level (0-10 scale): 0 out of 10 numeric scale, no pain just tightness    Any medication changes, allergies to medications, adverse drug reactions, diagnosis change, or new procedure performed?: [x] No    [] Yes (see summary sheet for update)  Medications: Verified on Patient Summary List    Subjective functional status/changes:        I still have tightness in my neck and am working on my exercising.  I had a spot removed from my scalp by Dermatology 2 weeks ago and it that was benign.    OBJECTIVE      Therapeutic Procedures:  Tx Min Billable or 1:1 Min (if diff from Tx Min) Procedure, Rationale, Specifics    92  56 [id number] Manual Therapy (timed):  decrease pain, increase ROM, increase tissue extensibility, decrease edema, and increase postural awareness to improve patient's ability to progress to PLOF and address remaining functional goals.  The manual therapy interventions were performed at a separate and distinct time from the therapeutic activities interventions . (see flow sheet as applicable)    Details if applicable:      Performed manual lymph drainage in seated position.  Deep abdominal breathing followed by manual lymph drainage with bilateral axillary nodes, anterior  axillo-axillary anastamoses from midline to corresponding lymph node group,  supraclavicular techniques, cervical techniques, shoulder techniques from 
discontinued  Goal Met?  no    3.   Patient will demonstrate a decrease in circumference at TMJ to chin  by 1.0 cm and  chin to apex of head by 1.5cm to reduce the risk of infection and progression of swelling over time for ease of performing driving and safe mobility.    Status at last Eval/Progress Note: in progress  Current Status: met  Goal Met?  yes     4. Patient will demonstrate an increase in right cervical rotation active range of motion of 20 degrees for ease of performing ADL's, including dressing and functional activities like driving..     Status at last Eval/Progress Note: in progress  Current Status:  met  Goal Met?  yes    5. Patient will demonstrate a decrease in circumference at TMJ to  eye  by 0.7 cm and  to upper neck by 1.5cm to reduce the risk of infection and progression of swelling over time for ease of performing driving and safe mobility. Met for upper neck    Status at last Eval/Progress Note: in progress  Current Status: in progress  Goal Met?  no             RECOMMENDATIONS FOR SKILLED THERAPY  Continue with evaluation as established on plan of care.         PRIYA Hernandez, CLT-TAMMIE      8/1/2024       3:51 PM    If you have any questions/comments please contact us directly at [phone].   Thank you for allowing us to assist in the care of your patient.

## 2024-08-12 ENCOUNTER — HOSPITAL ENCOUNTER (OUTPATIENT)
Facility: HOSPITAL | Age: 59
Setting detail: RECURRING SERIES
Discharge: HOME | End: 2024-08-15
Payer: COMMERCIAL

## 2024-08-12 PROCEDURE — 97140 MANUAL THERAPY 1/> REGIONS: CPT

## 2024-08-12 NOTE — PROGRESS NOTES
10  PHYSICAL THERAPY/OCCUPATIONAL THERAPY - DAILY TREATMENT NOTE (updated 3/23)      Date: 2024          Patient Name:  Kristin Ron :  1965   Medical   Diagnosis:  Lymphedema, not elsewhere classified [I89.0] Treatment Diagnosis:  I89.0     Lymphedema, not elsewhere classified    Referral Source:  Brett Camarena MD Insurance:   Payor: AETNA / Plan: AETNA / Product Type: *No Product type* /                     Patient  verified yes     Visit #   Current  / Total 16 16   Time   In / Out 0906  1030    Total Treatment Time 84    Total Timed Codes 84         SUBJECTIVE    Pain Level (0-10 scale): 0 out of 10 numeric scale, no pain just tightness    Any medication changes, allergies to medications, adverse drug reactions, diagnosis change, or new procedure performed?: [x] No    [] Yes (see summary sheet for update)  Medications: Verified on Patient Summary List    Subjective functional status/changes:     I still have some tightness in my neck.  I am nervous to see how my measurements are because I did self manual lymph drainage several days while I was away and didn't have my machine with me to use.      OBJECTIVE      Therapeutic Procedures:  Tx Min Billable or 1:1 Min (if diff from Tx Min) Procedure, Rationale, Specifics    84  40 [id number] Manual Therapy (timed):  decrease pain, increase ROM, increase tissue extensibility, decrease edema, and increase postural awareness to improve patient's ability to progress to PLOF and address remaining functional goals.  The manual therapy interventions were performed at a separate and distinct time from the therapeutic activities interventions . (see flow sheet as applicable)    Details if applicable:      Performed manual lymph drainage in seated position.  Deep abdominal breathing followed by manual lymph drainage with bilateral axillary nodes, anterior  axillo-axillary anastamoses from midline to corresponding lymph node group,  supraclavicular techniques,

## 2024-08-12 NOTE — THERAPY DISCHARGE
[facility]  a part of [facility]   [address], [address]  [place] [address]  Phone: [phone]  Fax: [phone]    DISCHARGE SUMMARY  Patient Name: Kristin Ron : 1965   Treatment/Medical Diagnosis: Lymphedema, not elsewhere classified [I89.0]   Referral Source: Brett Camarena MD     Date of Initial Visit: 3/25/2024 Attended Visits: 16 Missed Visits: 1     SUMMARY OF TREATMENT    Patient has been performing home program of manual lymph drainage or advanced vasopneumatic device consistently since she was last seen in therapy.  Decreased facial swelling in observed upon arrival to the clinic today and after manual lymph drainage again today.      Reassessment of head and neck measurements  yields decreases at  5 out of  13 measurements,  increases  at  1 out of 13 measurements, and 7 measurements remain the same .  Cervical range of motion is measuring within functional limits although patient still reports some tightness in the region.    Her condition continues to improve and she is independent with all aspects of her home management program.   Patient will be discharged to the home restorative phase of treatment and she will return to therapy in December for reassessment of swelling status and home program performance.      Short term goal 1, 2,3  and 5 are met. Long term goals 1, 3, and 4 are  met.  Long term goal 4 is mostly met and progressing toward goal attainment. Short term goal 4 and long term goal 2 are discontinued.            CURRENT STATUS      Short Term Goals: To be accomplished in 8 treatments  Patient and/or caregiver will verbalize 3/3 signs and symptoms of infection without external cueing, in order to reduce the risk of infection and skin impairment and to promote optimal self management of condition.         Status at last Eval/Progress Note: met  Current Status: met  Goal Met?   yes     Patient to perform 5/5 lymphedema remedial

## 2024-11-14 ENCOUNTER — TRANSCRIBE ORDERS (OUTPATIENT)
Facility: HOSPITAL | Age: 59
End: 2024-11-14

## 2024-11-14 DIAGNOSIS — C44.42 SQUAMOUS CELL CARCINOMA, SCALP/NECK: Primary | ICD-10-CM

## 2024-11-21 ENCOUNTER — HOSPITAL ENCOUNTER (OUTPATIENT)
Facility: HOSPITAL | Age: 59
Setting detail: RECURRING SERIES
Discharge: HOME | End: 2024-11-24
Payer: COMMERCIAL

## 2024-11-21 VITALS — BODY MASS INDEX: 27.23 KG/M2 | HEIGHT: 64 IN | WEIGHT: 159.5 LBS

## 2024-11-21 PROCEDURE — 97161 PT EVAL LOW COMPLEX 20 MIN: CPT

## 2024-11-21 PROCEDURE — 97110 THERAPEUTIC EXERCISES: CPT

## 2024-11-21 PROCEDURE — 97140 MANUAL THERAPY 1/> REGIONS: CPT

## 2024-11-21 PROCEDURE — 97535 SELF CARE MNGMENT TRAINING: CPT

## 2024-11-21 NOTE — THERAPY EVALUATION
to mid chin Right: 12.7  Left: 12.7   Right: 12.5  Left:12.6    Righ 12.5  Left: 13.6 Right: 12.8  Left: 13.8    TMJ to base of nose Right: 10.3  Left: 10.6   Right: 10.3  Left:10.3    Right: 10.3  Left: 11.0 Right: 10.2  Left: 10.5   TMJ to  eye corner Right: 8.1  Left: 8.1   Right: 8.0  Left:8.0    Right: 8.0  Left: 8.5 Right: 7.4  Left:8.2            Cervical active range of motion: degrees      11/21/2024 8/12/2024   Flexion:     0-45 0-60   Extension:    0-40 0-60   Right lateral rotation:    0-80 0-85   Left lateral rotation:   0-75 0-90   Right lateral flexion:    0-40 0-40   Left lateral flexion:   0-45 0-45            18 min [x]Eval - untimed                        Therapeutic Procedures:  Tx Min Billable or 1:1 Min (if diff from Tx Min) Procedure, Rationale, Specifics    16 16  [id number] Self Care/Home Management (timed):  improve patient knowledge and understanding of positioning, diagnosis/prognosis, physical therapy expectations, procedures and progression, and skin care  to improve patient's ability to progress to PLOF and address remaining functional goals.  (see flow sheet as applicable)    Details if applicable:      Reviewed home program performance and Flexitouch use.  Patient report she does not feel like the Flexitouch is providing the same pressures that it had when she originally got it.  Instructed patient that therapist will contact [employer] and notify them of the problem and discussed possible options to improve effectiveness of use. Addressed patient questions about time of day for use and options with regard to when to use it.  Educated patient in the benefits if using a foam pad inside the Flexitouch garment to target and soften more problematic areas.  Demonstrated how to use and place a komprex 2 pad.      Educated patient in the role of compression for long term management and showed patient garment options to try if face is not swelling that would provided less head coverage and

## 2024-12-03 ENCOUNTER — HOSPITAL ENCOUNTER (OUTPATIENT)
Facility: HOSPITAL | Age: 59
Setting detail: RECURRING SERIES
Discharge: HOME | End: 2024-12-06
Payer: COMMERCIAL

## 2024-12-03 PROCEDURE — 97140 MANUAL THERAPY 1/> REGIONS: CPT

## 2024-12-03 NOTE — PROGRESS NOTES
PHYSICAL THERAPY/OCCUPATIONAL THERAPY - DAILY TREATMENT NOTE (updated 3/23)      Date: 12/3/2024          Patient Name:  Kristin Ron :  1965   Medical   Diagnosis:  Lymphedema, not elsewhere classified [I89.0] Treatment Diagnosis:  I89.0     Lymphedema, not elsewhere classified    Referral Source:  Brett Camarena MD Insurance:   Payor: AETNA / Plan: AETNA / Product Type: *No Product type* /                     Patient  verified yes     Visit #   Current  / Total 2 12   Time   In / Out 0903 1030   Total Treatment Time 87   Total Timed Codes 87         SUBJECTIVE    Pain Level (0-10 scale): 0 out of 10 numeric scale, tightness along lateral and anterior left neck region,stiff     Any medication changes, allergies to medications, adverse drug reactions, diagnosis change, or new procedure performed?: [x] No    [] Yes (see summary sheet for update)  Medications: Verified on Patient Summary List    Subjective functional status/changes:     The [facility] Medical people called me from their corporate office twice but they were asking the same questions and I just couldn't talk to them the second time.  I used your compression garment a couple of times but I just haven't had the time to use it a lot with the holiday last week.  Patient reported she did not notice increased facial swelling with use.       OBJECTIVE      Therapeutic Procedures:  Tx Min Billable or 1:1 Min (if diff from Tx Min) Procedure, Rationale, Specifics   84 84 [id number] Manual Therapy (timed):  decrease pain, increase ROM, increase tissue extensibility, and decrease edema to improve patient's ability to progress to PLOF and address remaining functional goals.  The manual therapy interventions were performed at a separate and distinct time from the therapeutic activities interventions . (see flow sheet as applicable)    Details if applicable:    Performed manual lymph drainage in seated position.  Deep abdominal breathing followed by manual

## 2024-12-16 ENCOUNTER — HOSPITAL ENCOUNTER (OUTPATIENT)
Facility: HOSPITAL | Age: 59
Setting detail: RECURRING SERIES
Discharge: HOME | End: 2024-12-19
Payer: COMMERCIAL

## 2024-12-16 PROCEDURE — 97140 MANUAL THERAPY 1/> REGIONS: CPT

## 2024-12-16 NOTE — PROGRESS NOTES
[facility]  a part of [facility]   [address], [address]  [place] [address]  Phone: [phone]  Fax: [phone]    PHYSICAL THERAPY/OCCUPATIONAL THERAPY PROGRESS NOTE  Patient Name:  Kristin Ron :  1965   Treatment/Medical Diagnosis: Squamous cell carcinoma, scalp/neck [C44.42]   Referral Source:  Brett Camarena MD     Date of Initial Visit:  2024 Attended Visits:  3 Missed Visits:  0     SUMMARY OF TREATMENT/ASSESSMENT:       Patient has been following home program as recommended except for consistent compression which she has had difficulty incorporating into her management routine.  Cervical flexion and extension range of motion was increased on assessment today.  Rotation has not changed.     Reassessment of head and neck measurements yields decreases at 5 out of 13 measurements, increases at 1 out of 13 measurements and 7 measurements are unchanged.  The facial measurements have not increased in size and lower neck circumference has decreased.  Continued with soft tissue mobilizations to address tightness in left upper trapezius today with patient reporting soft tissue work performed last session improved her comfort level.  Will continue with treatment.    Short term goals and long term goals are met.        Patient will continue to benefit from skilled PT / OT services to address ROM deficits, address swelling, analyze compression product fit and use, instruct in home lymphedema management program, and measure for compression products to address functional deficits and attain remaining g  CURRENT STATUS/GOALS    Short Term Goals: To be accomplished in 6 treatments  Patient to perform 5/5 lymphedema remedial exercises/cervical range of motion exercises in session with modified independence utilizing HEP handout, in order to promote optimal independence with management of condition, as well as promote optimal limb volume reduction required for 
facial measurements have not increased in size and lower neck circumference has decreased.  Continued with soft tissue mobilizations to address tightness in left upper trapezius today with patient reporting soft tissue work performed last session improved her comfort level.  Will continue with treatment.    Short term goals and long term goals are met.        Patient will continue to benefit from skilled PT / OT services to address ROM deficits, address swelling, analyze compression product fit and use, instruct in home lymphedema management program, and measure for compression products to address functional deficits and attain remaining goals.    Progress toward goals / Updated goals:  [x]  See Progress Note/Recertification  Short Term Goals: To be accomplished in 6 treatments  Patient to perform 5/5 lymphedema remedial exercises/cervical range of motion exercises in session with modified independence utilizing HEP handout, in order to promote optimal independence with management of condition, as well as promote optimal limb volume reduction required for proper fit of donned clothing.  Reviewed previously.  Patient will demonstrate a decrease in circumference at upper neck  by 1.0cm and chin to apex of head measurement by 1cm to reduce the risk of infection and progression of swelling over time for ease of performing driving  and safe mobility. Met for upper neck.  3.    Patient will demonstrate an increase in left cervical rotation active range of motion by 10 degrees  and cervical extension by 10 degrees for ease of performing ADL's, including dressing and functional activities like driving. In progress, extension increased by 5 degrees.     Long Term Goals: To be accomplished in  12 treatments  1.   Patient/caregiver will demonstrate improved edema management as evidenced by performing donning/doffing of garments/compression bandage modified independent 3/3 times within session to aid in reducing risk for

## 2024-12-26 ENCOUNTER — APPOINTMENT (OUTPATIENT)
Facility: HOSPITAL | Age: 59
End: 2024-12-26
Payer: COMMERCIAL

## 2025-01-06 ENCOUNTER — APPOINTMENT (OUTPATIENT)
Facility: HOSPITAL | Age: 60
End: 2025-01-06
Payer: COMMERCIAL

## 2025-01-16 ENCOUNTER — HOSPITAL ENCOUNTER (OUTPATIENT)
Facility: HOSPITAL | Age: 60
Setting detail: RECURRING SERIES
Discharge: HOME | End: 2025-01-19
Payer: COMMERCIAL

## 2025-01-16 PROCEDURE — 97140 MANUAL THERAPY 1/> REGIONS: CPT

## 2025-01-16 NOTE — PROGRESS NOTES
[facility]  a part of [facility]   [address], [address]  [place] [address]  Phone: [phone]  Fax: [phone]    PHYSICAL THERAPY/OCCUPATIONAL THERAPY PROGRESS NOTE  Patient Name:  Kristin Ron :  1965   Treatment/Medical Diagnosis: Squamous cell carcinoma, scalp/neck [C44.42]   Referral Source:  Brett Camarena MD     Date of Initial Visit:  2024 Attended Visits:  4 Missed Visits:  0     SUMMARY OF TREATMENT/ASSESSMENT:    Patient returns to therapy for first time since the holidays.  Cervical flexion and extension range of motion was increased on assessment today.  Left rotation and right lateral flexion remains limited.    Reassessment of head and neck measurements yields decreases at 4  out of 13 measurements, increases at 5 out of 13 measurements and 4 measurements are unchanged.  The facial measurements have not increased in size.   Continued with soft tissue mobilizations to address tightness in left upper trapezius and left side neck musculature today. Added scar massage to left lateral neck scar and educated patient in techniques with instructions to perform daily.  Will continue with treatment and progress soft tissue techniques and exercises as able.     Short term goals and long term goals are in progress.         Patient will continue to benefit from skilled PT / OT services to address ROM deficits, address swelling, analyze compression product fit and use, instruct in home lymphedema management program, and measure for compression products to address functional deficits and attain remaining goals.              CURRENT STATUS/GOALS    Short Term Goals: To be accomplished in 6 treatments  Patient to perform 5/5 lymphedema remedial exercises/cervical range of motion exercises in session with modified independence utilizing HEP handout, in order to promote optimal independence with management of condition, as well as promote optimal limb

## 2025-01-16 NOTE — PROGRESS NOTES
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] Treatment Diagnosis:  I89.0     Lymphedema, not elsewhere classified    Referral Source:  Brett Camarena MD Insurance:   Payor: AETNA / Plan: AETNA / Product Type: *No Product type* /                     Patient  verified yes     Visit #   Current  / Total 4 12   Time   In / Out 1335   1510    Total Treatment Time 95    Total Timed Codes  95         SUBJECTIVE    Pain Level (0-10 scale): 0 out of 10 numeric scale, tightness along lateral and anterior left neck region,stiffness in region    Any medication changes, allergies to medications, adverse drug reactions, diagnosis change, or new procedure performed?: [x] No    [] Yes (see summary sheet for update)  Medications: Verified on Patient Summary List    Subjective functional status/changes:     My neck is so tight and I just can't get it to loosen.          OBJECTIVE      Therapeutic Procedures:  Tx Min Billable or 1:1 Min (if diff from Tx Min) Procedure, Rationale, Specifics   90   90 [id number] Manual Therapy (timed):  decrease pain, increase ROM, increase tissue extensibility, and decrease edema to improve patient's ability to progress to PLOF and address remaining functional goals.  The manual therapy interventions were performed at a separate and distinct time from the therapeutic activities interventions . (see flow sheet as applicable)    Details if applicable:    Performed manual lymph drainage in seated position.  Deep abdominal breathing followed by manual lymph drainage with bilateral axillary nodes, anterior and posterior axillo-axillary anastamoses from midline to corresponding lymph node group, deep paravertebral techniques, supraclavicular techniques, cervical techniques, shoulder techniques from neck to acromion, pre and retro

## 2025-01-23 ENCOUNTER — HOSPITAL ENCOUNTER (OUTPATIENT)
Facility: HOSPITAL | Age: 60
Setting detail: RECURRING SERIES
Discharge: HOME | End: 2025-01-26
Payer: COMMERCIAL

## 2025-01-23 PROCEDURE — 97110 THERAPEUTIC EXERCISES: CPT

## 2025-01-23 PROCEDURE — 97140 MANUAL THERAPY 1/> REGIONS: CPT

## 2025-01-23 NOTE — PROGRESS NOTES
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] Treatment Diagnosis:  I89.0     Lymphedema, not elsewhere classified    Referral Source:  Brett Camarena MD Insurance:   Payor: AETNA / Plan: AETNA / Product Type: *No Product type* /                     Patient  verified yes     Visit #   Current  / Total 5 12   Time   In / Out  1033 1159    Total Treatment Time 86    Total Timed Codes 86          SUBJECTIVE    Pain Level (0-10 scale): 0 out of 10 numeric scale, tightness along lateral and anterior left neck region,stiffness in region    Any medication changes, allergies to medications, adverse drug reactions, diagnosis change, or new procedure performed?: [x] No    [] Yes (see summary sheet for update)  Medications: Verified on Patient Summary List    Subjective functional status/changes:     It is still really tight.  I am doing my exercises the best I can.  I have been doing the scare massage a couple of minutes a day.       OBJECTIVE      Therapeutic Procedures:  Tx Min Billable or 1:1 Min (if diff from Tx Min) Procedure, Rationale, Specifics    73 73   86391 Manual Therapy (timed):  decrease pain, increase ROM, increase tissue extensibility, and decrease edema to improve patient's ability to progress to PLOF and address remaining functional goals.  The manual therapy interventions were performed at a separate and distinct time from the therapeutic activities interventions . (see flow sheet as applicable)    Details if applicable:    Performed manual lymph drainage in seated position.  Deep abdominal breathing followed by manual lymph drainage with bilateral axillary nodes, anterior and posterior axillo-axillary anastamoses from midline to corresponding lymph node group, deep paravertebral techniques, supraclavicular techniques,

## 2025-02-04 ENCOUNTER — APPOINTMENT (OUTPATIENT)
Facility: HOSPITAL | Age: 60
End: 2025-02-04
Payer: COMMERCIAL

## 2025-02-18 ENCOUNTER — HOSPITAL ENCOUNTER (OUTPATIENT)
Facility: HOSPITAL | Age: 60
Setting detail: RECURRING SERIES
Discharge: HOME | End: 2025-02-21
Payer: COMMERCIAL

## 2025-02-18 PROCEDURE — 97110 THERAPEUTIC EXERCISES: CPT

## 2025-02-18 PROCEDURE — 97140 MANUAL THERAPY 1/> REGIONS: CPT

## 2025-02-18 NOTE — PROGRESS NOTES
[facility]  a part of [facility]   [address], [address]  [place] [address]  Phone: [phone]  Fax: [phone]    PHYSICAL THERAPY/OCCUPATIONAL THERAPY PROGRESS NOTE  Patient Name:  Kristin Ron :  1965   Treatment/Medical Diagnosis: Squamous cell carcinoma, scalp/neck [C44.42]   Referral Source:  Brett Camarena MD     Date of Initial Visit:  2024 Attended Visits:  6 Missed Visits:  1     SUMMARY OF TREATMENT/ASSESSMENT:     Patient continues to report tightness on left lateral neck and upper trapezius.  Continued with soft tissue mobilizations to address tightness in left upper trapezius and left side neck musculature today.  Continued with scar massage to left lateral neck scar but increased scar mobility and softening of scar texture is noted since last week.    Reassessment of head and neck measurements yields decreases at 3  out of 13 measurements, increases at 1 out of 13 measurements and 9 measurements are unchanged.  The facial measurements have not increased in size. Cervical range of motion has increased for left lateral rotation.  Right lateral flexion range of motion remains decreased.  Will continue with treatment and progress soft tissue techniques and exercises as able.     Short term goal 2  is met.  All other short and long term goals are in progress.         Patient will continue to benefit from skilled PT / OT services to address ROM deficits, address swelling, analyze compression product fit and use, instruct in home lymphedema management program, and measure for compression products to address functional deficits and attain remaining goals.        CURRENT STATUS/GOALS    Short Term Goals: To be accomplished in 6 treatments  Patient to perform 5/5 lymphedema remedial exercises/cervical range of motion exercises in session with modified independence utilizing HEP handout, in order to promote optimal independence with management of

## 2025-02-18 NOTE — PROGRESS NOTES
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] Treatment Diagnosis:  I89.0     Lymphedema, not elsewhere classified    Referral Source:  Brett Camarena MD Insurance:   Payor: AETNA / Plan: AETNA / Product Type: *No Product type* /                     Patient  verified yes     Visit #   Current  / Total 6 12   Time   In / Out  0905 1030    Total Treatment Time 85     Total Timed Codes 85          SUBJECTIVE    Pain Level (0-10 scale): 0 out of 10 numeric scale, tightness along lateral and anterior left neck region,stiffness in region    Any medication changes, allergies to medications, adverse drug reactions, diagnosis change, or new procedure performed?: [x] No    [] Yes (see summary sheet for update)  Medications: Verified on Patient Summary List    Subjective functional status/changes:      My neck feels looser after exercises but tightness returns.   I have been doing the exercises periodically throughout the day.  I was on a cruise last week.  I cancelled the appointment before because I went to have a tooth checked.    OBJECTIVE      Therapeutic Procedures:  Tx Min Billable or 1:1 Min (if diff from Tx Min) Procedure, Rationale, Specifics   76  76   40702 Manual Therapy (timed):  decrease pain, increase ROM, increase tissue extensibility, and decrease edema to improve patient's ability to progress to PLOF and address remaining functional goals.  The manual therapy interventions were performed at a separate and distinct time from the therapeutic activities interventions . (see flow sheet as applicable)    Details if applicable:    Performed manual lymph drainage in seated position.  Deep abdominal breathing followed by manual lymph drainage with bilateral axillary nodes, anterior and posterior axillo-axillary anastamoses from midline

## 2025-03-13 ENCOUNTER — HOSPITAL ENCOUNTER (OUTPATIENT)
Facility: HOSPITAL | Age: 60
Setting detail: RECURRING SERIES
Discharge: HOME | End: 2025-03-16
Payer: COMMERCIAL

## 2025-03-13 PROCEDURE — 97140 MANUAL THERAPY 1/> REGIONS: CPT

## 2025-03-13 NOTE — PROGRESS NOTES
PHYSICAL THERAPY/OCCUPATIONAL THERAPY - DAILY TREATMENT NOTE (updated 3/23)      Date: 3/13/2025          Patient Name:  Kristin Ron :  1965   Medical   Diagnosis:  Squamous cell carcinoma, scalp/neck [C44.42] Treatment Diagnosis:  I89.0     Lymphedema, not elsewhere classified    Referral Source:  Brett Camarena MD Insurance:   Payor: AETNA / Plan: AETNA / Product Type: *No Product type* /                     Patient  verified yes     Visit #   Current  / Total 7 12   Time   In / Out   1204 1332    Total Treatment Time  88   Total Timed Codes  88         SUBJECTIVE    Pain Level (0-10 scale): 0 out of 10 numeric scale, tightness along lateral and anterior left neck region,stiffness in region    Any medication changes, allergies to medications, adverse drug reactions, diagnosis change, or new procedure performed?: [x] No    [] Yes (see summary sheet for update)  Medications: Verified on Patient Summary List    Subjective functional status/changes:       My neck is still stiff and tight. I feel like this side  of my face is still more swollen- what do you think.  I have been working on my exercises and stretches. I have not used the compression garment/bandage you made for me yet.    OBJECTIVE      Therapeutic Procedures:  Tx Min Billable or 1:1 Min (if diff from Tx Min) Procedure, Rationale, Specifics     88 97 [id number] Manual Therapy (timed):  decrease pain, increase ROM, increase tissue extensibility, and decrease edema to improve patient's ability to progress to PLOF and address remaining functional goals.  The manual therapy interventions were performed at a separate and distinct time from the therapeutic activities interventions . (see flow sheet as applicable)    Details if applicable:      Prior to manual lymph drainage therapist also performed  manual soft tissue mobilizations and trigger point release techniques to left side upper trapezius muscle and lateral neck musculature to address 
the care of your patient.

## 2025-03-27 ENCOUNTER — APPOINTMENT (OUTPATIENT)
Facility: HOSPITAL | Age: 60
End: 2025-03-27
Payer: COMMERCIAL

## 2025-04-01 ENCOUNTER — APPOINTMENT (OUTPATIENT)
Facility: HOSPITAL | Age: 60
End: 2025-04-01
Payer: COMMERCIAL

## 2025-04-07 ENCOUNTER — HOSPITAL ENCOUNTER (OUTPATIENT)
Facility: HOSPITAL | Age: 60
Setting detail: RECURRING SERIES
Discharge: HOME | End: 2025-04-10
Payer: COMMERCIAL

## 2025-04-07 PROCEDURE — 97140 MANUAL THERAPY 1/> REGIONS: CPT

## 2025-04-07 NOTE — PROGRESS NOTES
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] Treatment Diagnosis:  I89.0     Lymphedema, not elsewhere classified    Referral Source:  Brett Camarena MD Insurance:   Payor: AETNA / Plan: AETNA / Product Type: *No Product type* /                     Patient  verified yes     Visit #   Current  / Total 8 12   Time   In / Out    1157  1331   Total Treatment Time 94    Total Timed Codes 94          SUBJECTIVE    Pain Level (0-10 scale): 0 out of 10 numeric scale, tightness along lateral and anterior left neck region,stiffness in region    Any medication changes, allergies to medications, adverse drug reactions, diagnosis change, or new procedure performed?: [x] No    [] Yes (see summary sheet for update)  Medications: Verified on Patient Summary List    Subjective functional status/changes:        I have been working on my exercises. I feel like things loosen up more when you work on me in sitting.  I am still feeling the tightness but it doesn't feel quite as intense as it did before.  My father -in law has gone into hospice so it has been busy.    OBJECTIVE      Therapeutic Procedures:  Tx Min Billable or 1:1 Min (if diff from Tx Min) Procedure, Rationale, Specifics   94   94 12619 Manual Therapy (timed):  decrease pain, increase ROM, increase tissue extensibility, and decrease edema to improve patient's ability to progress to PLOF and address remaining functional goals.  The manual therapy interventions were performed at a separate and distinct time from the therapeutic activities interventions . (see flow sheet as applicable)    Details if applicable:      Prior to manual lymph drainage therapist also performed  manual soft tissue mobilizations and trigger point release techniques to left side upper trapezius muscle and lateral neck 
PRIYA Kurtz, CLT-TAMMIE      4/7/2025       3:40 PM    If you have any questions/comments please contact us directly at [phone].   Thank you for allowing us to assist in the care of your patient.

## 2025-04-14 ENCOUNTER — HOSPITAL ENCOUNTER (OUTPATIENT)
Facility: HOSPITAL | Age: 60
Setting detail: RECURRING SERIES
Discharge: HOME | End: 2025-04-17
Payer: COMMERCIAL

## 2025-04-14 PROCEDURE — 97140 MANUAL THERAPY 1/> REGIONS: CPT

## 2025-04-14 NOTE — PROGRESS NOTES
left upper trapezius, left paraspinal muscles and left side neck musculature today.  Incorporated cervical range of motion stretches and strengthening into soft tissue work.   Will continue with treatment and progress soft tissue techniques and exercises as able.    Short term goasl 1 and 2 and long term goal 3 are met.  All other short and long term goals are in progress.       Patient will continue to benefit from skilled PT / OT services to address ROM deficits, address swelling, analyze compression product fit and use, instruct in home lymphedema management program, and measure for compression products to address functional deficits and attain remaining goals.    Progress toward goals / Updated goals:  []  See Progress Note/Recertification  Short Term Goals: To be accomplished in 6 treatments  Patient to perform 5/5 lymphedema remedial exercises/cervical range of motion exercises in session with modified independence utilizing HEP handout, in order to promote optimal independence with management of condition, as well as promote optimal limb volume reduction required for proper fit of donned clothing.   met  Patient will demonstrate a decrease in circumference at upper neck  by 1.0cm and chin to apex of head measurement by 1cm to reduce the risk of infection and progression of swelling over time for ease of performing driving  and safe mobility. Met.  3.    Patient will demonstrate an increase in left cervical rotation active range of motion by 10 degrees  and cervical extension by 10 degrees for ease of performing ADL's, including dressing and functional activities like driving. In progress, extension increased by 20 degrees, rotation has increased by 5 degrees     Long Term Goals: To be accomplished in  12 treatments  1.   Patient/caregiver will demonstrate improved edema management as evidenced by performing donning/doffing of garments/compression bandage modified independent 3/3 times within session to aid in

## 2025-04-21 ENCOUNTER — APPOINTMENT (OUTPATIENT)
Facility: HOSPITAL | Age: 60
End: 2025-04-21
Payer: COMMERCIAL

## 2025-04-28 ENCOUNTER — HOSPITAL ENCOUNTER (OUTPATIENT)
Facility: HOSPITAL | Age: 60
Setting detail: RECURRING SERIES
Discharge: HOME | End: 2025-05-01
Payer: COMMERCIAL

## 2025-04-28 PROCEDURE — 97140 MANUAL THERAPY 1/> REGIONS: CPT

## 2025-04-28 NOTE — PROGRESS NOTES
[facility]  a part of [facility]   [address], [address]  [place] [address]  Phone: [phone]  Fax: [phone]    PHYSICAL THERAPY/OCCUPATIONAL THERAPY PROGRESS NOTE  Patient Name:  Kristin Ron :  1965   Treatment/Medical Diagnosis: Squamous cell carcinoma, scalp/neck [C44.42]   Referral Source:  Brett Camarena MD     Date of Initial Visit:  2024 Attended Visits:  9 Missed Visits:  2     SUMMARY OF TREATMENT/ASSESSMENT:        Patient continues to report tightness on left lateral neck and upper trapezius.  Her symptoms improved after last treatment session but tightness has reoccurred since last week.    Continued with soft tissue mobilizations to address tightness in left upper trapezius, left paraspinal muscles and left side neck musculature today.  Incorporated cervical range of motion stretches and strengthening into soft tissue work.    Reassessment of head and neck measurements yields decreases at 1 out of 13 measurements, increases at 2 out of 13 measurements and 10 measurements are unchanged.   Cervical range of motion was decreased for extension, flexion, and left lateral flexion.  Will continue with treatment and progress soft tissue techniques and exercises as able.     Short term goasl 1 and 2 and long term goal 3 are met.  All other short and long term goals are in progress.       Patient will continue to benefit from skilled PT / OT services to address ROM deficits, address swelling, analyze compression product fit and use, instruct in home lymphedema management program, and measure for compression products to address functional deficits and attain remaining goals.    CURRENT STATUS/GOALS    Short Term Goals: To be accomplished in 6 treatments  Patient to perform 5/5 lymphedema remedial exercises/cervical range of motion exercises in session with modified independence utilizing HEP handout, in order to promote optimal independence with 
tightness and discomfort reported by patient.     Performed techniques in seated position per patient feedback today. Incorporated stretches for right rotation and right lateral flexion with corresponding soft tissue mobilizations for left side of neck musculature. Performed S mobilization to upper trapezius and left lateral neck musculature.  Spent increased time on these techniques today. Followed with scar massage to left lateral neck with good scar mobility noted throughout 3/4 of the scar.  The most superior portion of the scar was a little less mobile prior to scar massage today.      Proceeded with manual lymph drainage in seated position.  Deep abdominal breathing followed by manual lymph drainage with bilateral axillary nodes, anterior and posterior axillo-axillary anastamoses from midline to corresponding lymph node group, deep paravertebral techniques, supraclavicular techniques, cervical techniques, shoulder techniques from neck to acromion, pre and retro auricular techniques, submanibular techniques, and lower facial techniques.  Worked anterior and lateral neck toward corresponding side of axillo axillary anastamosis on anterior upper quadrant.             88  88    Total Total      Patient Education: [] Review HEP and educated in moist heat to left upper trapezius before stretching   [x]  Patient Education billed concurrently with other procedures   [x] MLD Patient Education Reviewed with patient, as well as demonstration and instruction during MLD portion of session, educated in dicem for soft tissue mobilization  [] Progressed/Changed HEP based on:   [] positioning   [] Kinesiotape   [] Skin care   [] wound care   [] other: compression garment use and wearing schedule, role of compression  []   Patient / caregiver re-demonstrated bandaging. [] Yes  [] No  Compression bandaging/garment precautions reviewed: [] Yes  [] No      Other Objective/Functional Measures:       Head and neck measurements as

## 2025-05-05 ENCOUNTER — APPOINTMENT (OUTPATIENT)
Facility: HOSPITAL | Age: 60
End: 2025-05-05
Payer: COMMERCIAL

## 2025-06-09 ENCOUNTER — HOSPITAL ENCOUNTER (OUTPATIENT)
Facility: HOSPITAL | Age: 60
Setting detail: RECURRING SERIES
Discharge: HOME | End: 2025-06-12
Payer: COMMERCIAL

## 2025-06-09 PROCEDURE — 97140 MANUAL THERAPY 1/> REGIONS: CPT

## 2025-06-09 NOTE — PROGRESS NOTES
[facility]  a part of [facility]   [address], [address]  [place] [address]  Phone: [phone]  Fax: [phone]    PHYSICAL THERAPY/OCCUPATIONAL THERAPY PROGRESS NOTE  Patient Name:  Kristin Ron :  1965   Treatment/Medical Diagnosis: Squamous cell carcinoma, scalp/neck [C44.42]   Referral Source:  Brett Camarena MD     Date of Initial Visit:  2024 Attended Visits:  11 Missed Visits:  4     SUMMARY OF TREATMENT/ASSESSMENT:        Patient returns from traveling to [place] and she reports increased tightness in the neck that extends to anterior neck.  Reassessment shows increases in neck circumferences and decreased cervical extension range of motion since she was last assessed.  This indicates some increases in swelling in the area that she is experiencing the increased tightness.  She also continues to report tightness on left lateral neck and upper trapezius.  Her symptoms improved after  treatment today.  Recommended we teach her  the soft tissue techniques to help decrease tightness and for continuity of care.   Continued with soft tissue mobilizations to address tightness in left upper trapezius, left paraspinal muscles and left side neck musculature today.  Also performed manual lymph drainage to decongest the neck region.   Reassessment of head and neck measurements yields decreases at 1 out of 13 measurements, increases at  3 out of 13 measurements and 9 measurements are unchanged.   Cervical range of motion was decreased for extension and left lateral rotation.  Will continue with treatment and progress soft tissue techniques and exercises as able.      Short term goals 1 and 2 and long term goal 3 are met.  All other short and long term goals are in progress.         Patient will continue to benefit from skilled PT / OT services to address ROM deficits, address swelling, analyze compression product fit and use, instruct in home 
and lateral neck musculature to address muscle tightness and discomfort reported by patient.     Performed techniques in seated position.   Performed S mobilization to upper trapezius and left lateral neck musculature.  Spent increased time on these techniques today.  Increased muscle and tissue tightness is palpable throughout th region versus last session.  Discussed teaching her  how to perform soft tissue mobilizations since they continue to improve after treatment but reoccur in 1-2 days.  Educated patient that she could then take some time each day to have him work on her and she reports that he is good at things like that and she will bring him in to learn and practice the techniques.      Proceeded with manual lymph drainage in seated position.  Deep abdominal breathing followed by manual lymph drainage with bilateral axillary nodes, anterior and posterior axillo-axillary anastamoses from midline to corresponding lymph node group, deep paravertebral techniques, supraclavicular techniques, cervical techniques, shoulder techniques from neck to acromion, pre and retro auricular techniques, submanibular techniques, and lower facial techniques.  Worked anterior and lateral neck toward corresponding side of axillo axillary anastamosis on anterior upper quadrant. Spent increased time working on anterior neck today to address increases in swelling noted there on reassessment.          3 3 [id number] Therapeutic Exercise (timed):  increase ROM, strength, coordination, balance, and proprioception to improve patient's ability to progress to PLOF and address remaining functional goals. (see flow sheet as applicable)    Details if applicable:      [] Chen Ball Exercises [] Remedial Lymphedema Exercise Program []   Deep abdominal breathing  [x]     Performed upper trapezius stretch with 30 second hold after soft tissue mobilizations and 3 repetitions.         85 85     Total Total      Patient Education: [x] Review

## 2025-06-26 ENCOUNTER — APPOINTMENT (OUTPATIENT)
Facility: HOSPITAL | Age: 60
End: 2025-06-26
Payer: COMMERCIAL

## 2025-06-30 ENCOUNTER — HOSPITAL ENCOUNTER (OUTPATIENT)
Facility: HOSPITAL | Age: 60
Setting detail: RECURRING SERIES
Discharge: HOME | End: 2025-07-03
Payer: COMMERCIAL

## 2025-06-30 PROCEDURE — 97140 MANUAL THERAPY 1/> REGIONS: CPT

## 2025-06-30 NOTE — THERAPY RECERTIFICATION
[facility]  a part of [address], [address]  [place] [address]  Phone: [phone]  Fax: [phone]    CONTINUED PLAN OF CARE/RECERTIFICATION FOR PHYSICAL THERAPY or OCCUPATIONAL THERAPY        Patient Name:              Kristin Ron :  1965   Treatment/Medical Diagnosis:  Squamous cell carcinoma, scalp/neck [C44.42]   Onset Date:  3/14/2024    Referral Source:  Brett Camarena MD Start of Care (SOC):  2024   Prior Hospitalization:  See Medical History Provider #:  [id number]      Prior Level of Function (PLOF):  See evaluation   Comorbidities:  See evaluation    Medications:  Verified on Patient Summary List   Visits from SOC:  12 Missed Visits:  4     Progress toward Goals:   Short Term Goals: To be accomplished in 6 treatments  Patient to perform 5/5 lymphedema remedial exercises/cervical range of motion exercises in session with modified independence utilizing HEP handout, in order to promote optimal independence with management of condition, as well as promote optimal limb volume reduction required for proper fit of donned clothing.  Status at last Eval/Progress Note: in progress  Current Status: met  Goal Met?  yes  Patient will demonstrate a decrease in circumference at upper neck  by 1.0cm and chin to apex of head measurement by 1cm to reduce the risk of infection and progression of swelling over time for ease of performing driving  and safe mobility.  Status at last Eval/Progress Note: in progress  Current Status:  met  Goal Met?  yes  3.    Patient will demonstrate an increase in left cervical rotation active range of motion by 10 degrees  and cervical extension by 10 degrees for ease of performing ADL's, including dressing and functional activities like driving.  Status at last Eval/Progress Note: in progress  Current Status: in progress  Goal Met?  no     Long Term Goals: To be accomplished in  12 treatments  1.   Patient/caregiver

## 2025-06-30 NOTE — PROGRESS NOTES
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] Treatment Diagnosis:  I89.0     Lymphedema, not elsewhere classified    Referral Source:  Brett Camarena MD Insurance:   Payor: AETNA / Plan: AETNA / Product Type: *No Product type* /                     Patient  verified yes     Visit #   Current  / Total 12 12   Time   In / Out  0801 0900    Total Treatment Time  59   Total Timed Codes  59         SUBJECTIVE    Pain Level (0-10 scale):  0 out of 10 numeric scale, tightness along lateral and anterior left neck region,stiffness in region    Any medication changes, allergies to medications, adverse drug reactions, diagnosis change, or new procedure performed?: [x] No    [] Yes (see summary sheet for update)  Medications: Verified on Patient Summary List    Subjective functional status/changes:        I brought Darinel with me today.  I still have that tightness in the area.       OBJECTIVE      Therapeutic Procedures:  Tx Min Billable or 1:1 Min (if diff from Tx Min) Procedure, Rationale, Specifics      59  27 [id number] Manual Therapy (timed):  decrease pain, increase ROM, increase tissue extensibility, and decrease edema to improve patient's ability to progress to PLOF and address remaining functional goals.  The manual therapy interventions were performed at a separate and distinct time from the therapeutic activities interventions . (see flow sheet as applicable)    Details if applicable:      Prior to manual lymph drainage therapist also performed  manual soft tissue mobilizations and trigger point release techniques to left side upper trapezius muscle and lateral neck musculature to address muscle tightness and discomfort reported by patient.     Performed techniques in seated position.   Performed S mobilization to upper trapezius and left

## 2025-07-15 ENCOUNTER — HOSPITAL ENCOUNTER (OUTPATIENT)
Facility: HOSPITAL | Age: 60
Setting detail: RECURRING SERIES
Discharge: HOME | End: 2025-07-18
Payer: COMMERCIAL

## 2025-07-15 PROCEDURE — 97140 MANUAL THERAPY 1/> REGIONS: CPT

## 2025-07-15 NOTE — PROGRESS NOTES
PHYSICAL THERAPY/OCCUPATIONAL THERAPY - DAILY TREATMENT NOTE (updated 3/23)      Date: 7/15/2025          Patient Name:  Kristin Ron :  1965   Medical   Diagnosis:  Squamous cell carcinoma, scalp/neck [C44.42] Treatment Diagnosis:  I89.0     Lymphedema, not elsewhere classified    Referral Source:  Brett Camarena MD Insurance:   Payor: AETNA / Plan: AETNA / Product Type: *No Product type* /                     Patient  verified yes     Visit #   Current  / Total 13 Updated to 18   Time   In / Out 0857    1028    Total Treatment Time 91    Total Timed Codes  91          SUBJECTIVE    Pain Level (0-10 scale):  0 out of 10 numeric scale, tightness along lateral and anterior left neck region,stiffness in region    Any medication changes, allergies to medications, adverse drug reactions, diagnosis change, or new procedure performed?: [x] No    [] Yes (see summary sheet for update)  Medications: Verified on Patient Summary List    Subjective functional status/changes:      I was supposed to have my updated CT for the head and chest but the head wasn't approved but it is now so it will be rescheduled soon.  We just got back from [place].  We did not work on my head and neck tightness while I was gone but we will  start now that things are back to normal.            OBJECTIVE      Therapeutic Procedures:  Tx Min Billable or 1:1 Min (if diff from Tx Min) Procedure, Rationale, Specifics    91 36 [id number] Manual Therapy (timed):  decrease pain, increase ROM, increase tissue extensibility, and decrease edema to improve patient's ability to progress to PLOF and address remaining functional goals.  The manual therapy interventions were performed at a separate and distinct time from the therapeutic activities interventions . (see flow sheet as applicable)    Details if applicable:      Prior to manual lymph drainage therapist also performed  manual soft tissue mobilizations and trigger point release

## 2025-07-21 ENCOUNTER — HOSPITAL ENCOUNTER (OUTPATIENT)
Facility: HOSPITAL | Age: 60
Setting detail: RECURRING SERIES
Discharge: HOME | End: 2025-07-24
Payer: COMMERCIAL

## 2025-07-21 PROCEDURE — 97140 MANUAL THERAPY 1/> REGIONS: CPT

## 2025-07-21 NOTE — PROGRESS NOTES
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] Treatment Diagnosis:  I89.0     Lymphedema, not elsewhere classified    Referral Source:  Brett Camarena MD Insurance:   Payor: AETNA / Plan: AETNA / Product Type: *No Product type* /                     Patient  verified yes     Visit #   Current  / Total 14 Updated to 18   Time   In / Out  1333   1458    Total Treatment Time 85    Total Timed Codes  85         SUBJECTIVE    Pain Level (0-10 scale):  0 out of 10 numeric scale, tightness along lateral and anterior left neck region, stiffness in region    Any medication changes, allergies to medications, adverse drug reactions, diagnosis change, or new procedure performed?: [x] No    [] Yes (see summary sheet for update)  Medications: Verified on Patient Summary List    Subjective functional status/changes:       Just having the tightness.  I hope we will have good news on our results today.  I am scheduled for CT scan on Thursday.  Darinel and I started working on our home program and I think I do feel a little looser.          OBJECTIVE      Therapeutic Procedures:  Tx Min Billable or 1:1 Min (if diff from Tx Min) Procedure, Rationale, Specifics    85  31 [id number] Manual Therapy (timed):  decrease pain, increase ROM, increase tissue extensibility, and decrease edema to improve patient's ability to progress to PLOF and address remaining functional goals.  The manual therapy interventions were performed at a separate and distinct time from the therapeutic activities interventions . (see flow sheet as applicable)    Details if applicable:      Prior to manual lymph drainage therapist also performed manual soft tissue mobilizations and trigger point release techniques to left side upper trapezius muscle and lateral neck musculature to address muscle

## 2025-07-21 NOTE — PROGRESS NOTES
[facility]  a part of [facility]   [address], [address]  [place] [address]  Phone: [phone]  Fax: [phone]    PHYSICAL THERAPY/OCCUPATIONAL THERAPY PROGRESS NOTE  Patient Name:  Kristin Ron :  1965   Treatment/Medical Diagnosis: Squamous cell carcinoma, scalp/neck [C44.42]   Referral Source:  Brett Camarena MD     Date of Initial Visit:  2024 Attended Visits:  13 Missed Visits:  4     SUMMARY OF TREATMENT/ASSESSMENT:      Patient returns for treatment today.    Continued with soft tissue mobilizations to address tightness in left upper trapezius, left paraspinal muscles and left side neck musculature today.  Also performed manual lymph drainage to decongest the neck region.  Continued to encourage home performance of techniques by patient's  Darinel between treatment sessions and advised her that he should return for additional training if needed.   Reassessment of head and neck measurements yields decreases at 4 out of 13 measurements, increases at  0 out of 13 measurements and 9 measurements are unchanged.  Cervical range of motion is within functional range for all motions except right lateral flexion.   Will continue with treatment and progress soft tissue techniques and exercises as able.      Short term goals 1, 2 and are met and long term goal 3  met.  All other short and long term goals are in progress.       Patient will continue to benefit from skilled PT / OT services to address ROM deficits, address swelling, analyze compression product fit and use, instruct in home lymphedema management program, and measure for compression products to address functional deficits and attain remaining goals.      CURRENT STATUS/GOALS    Short Term Goals: To be accomplished in 6 treatments  Patient to perform 5/5 lymphedema remedial exercises/cervical range of motion exercises in session with modified independence utilizing HEP handout, in order

## 2025-08-04 ENCOUNTER — HOSPITAL ENCOUNTER (OUTPATIENT)
Facility: HOSPITAL | Age: 60
Setting detail: RECURRING SERIES
Discharge: HOME | End: 2025-08-07
Payer: COMMERCIAL

## 2025-08-04 PROCEDURE — 97140 MANUAL THERAPY 1/> REGIONS: CPT

## 2025-08-11 ENCOUNTER — APPOINTMENT (OUTPATIENT)
Facility: HOSPITAL | Age: 60
End: 2025-08-11
Payer: COMMERCIAL

## 2025-08-14 ENCOUNTER — HOSPITAL ENCOUNTER (OUTPATIENT)
Facility: HOSPITAL | Age: 60
Setting detail: RECURRING SERIES
Discharge: HOME | End: 2025-08-17
Payer: COMMERCIAL

## 2025-08-14 PROCEDURE — 97140 MANUAL THERAPY 1/> REGIONS: CPT

## 2025-08-18 ENCOUNTER — APPOINTMENT (OUTPATIENT)
Facility: HOSPITAL | Age: 60
End: 2025-08-18
Payer: COMMERCIAL

## 2025-08-25 ENCOUNTER — HOSPITAL ENCOUNTER (OUTPATIENT)
Facility: HOSPITAL | Age: 60
Setting detail: RECURRING SERIES
Discharge: HOME | End: 2025-08-28
Payer: COMMERCIAL

## 2025-08-25 PROCEDURE — 97140 MANUAL THERAPY 1/> REGIONS: CPT
